# Patient Record
Sex: FEMALE | Race: WHITE | NOT HISPANIC OR LATINO | Employment: OTHER | URBAN - METROPOLITAN AREA
[De-identification: names, ages, dates, MRNs, and addresses within clinical notes are randomized per-mention and may not be internally consistent; named-entity substitution may affect disease eponyms.]

---

## 2017-02-21 ENCOUNTER — HISTORICAL (OUTPATIENT)
Dept: LAB | Facility: HOSPITAL | Age: 67
End: 2017-02-21

## 2017-04-12 ENCOUNTER — HISTORICAL (OUTPATIENT)
Dept: ADMINISTRATIVE | Facility: HOSPITAL | Age: 67
End: 2017-04-12

## 2017-06-19 ENCOUNTER — HISTORICAL (OUTPATIENT)
Dept: FAMILY MEDICINE | Facility: CLINIC | Age: 67
End: 2017-06-19

## 2017-06-21 ENCOUNTER — HISTORICAL (OUTPATIENT)
Dept: FAMILY MEDICINE | Facility: CLINIC | Age: 67
End: 2017-06-21

## 2017-07-05 ENCOUNTER — HISTORICAL (OUTPATIENT)
Dept: FAMILY MEDICINE | Facility: CLINIC | Age: 67
End: 2017-07-05

## 2018-04-09 ENCOUNTER — HISTORICAL (OUTPATIENT)
Dept: INTERNAL MEDICINE | Facility: CLINIC | Age: 68
End: 2018-04-09

## 2018-07-09 ENCOUNTER — HISTORICAL (OUTPATIENT)
Dept: FAMILY MEDICINE | Facility: CLINIC | Age: 68
End: 2018-07-09

## 2018-10-26 ENCOUNTER — HISTORICAL (OUTPATIENT)
Dept: FAMILY MEDICINE | Facility: CLINIC | Age: 68
End: 2018-10-26

## 2018-11-27 ENCOUNTER — HISTORICAL (OUTPATIENT)
Dept: ADMINISTRATIVE | Facility: HOSPITAL | Age: 68
End: 2018-11-27

## 2018-12-06 ENCOUNTER — HISTORICAL (OUTPATIENT)
Dept: FAMILY MEDICINE | Facility: CLINIC | Age: 68
End: 2018-12-06

## 2019-04-12 ENCOUNTER — HISTORICAL (OUTPATIENT)
Dept: RADIOLOGY | Facility: HOSPITAL | Age: 69
End: 2019-04-12

## 2019-04-30 ENCOUNTER — HISTORICAL (OUTPATIENT)
Dept: RADIOLOGY | Facility: HOSPITAL | Age: 69
End: 2019-04-30

## 2019-05-29 ENCOUNTER — HISTORICAL (OUTPATIENT)
Dept: LAB | Facility: HOSPITAL | Age: 69
End: 2019-05-29

## 2019-05-31 ENCOUNTER — HISTORICAL (OUTPATIENT)
Dept: RADIOLOGY | Facility: HOSPITAL | Age: 69
End: 2019-05-31

## 2019-12-20 ENCOUNTER — HISTORICAL (OUTPATIENT)
Dept: ADMINISTRATIVE | Facility: HOSPITAL | Age: 69
End: 2019-12-20

## 2019-12-20 LAB
CREAT UR-MCNC: 142 MG/DL
MICROALBUMIN UR-MCNC: 23.5 MG/L (ref 0–19)
MICROALBUMIN/CREAT RATIO PNL UR: 16.5 MCG/MG CR (ref 0–29)

## 2019-12-30 ENCOUNTER — HISTORICAL (OUTPATIENT)
Dept: FAMILY MEDICINE | Facility: CLINIC | Age: 69
End: 2019-12-30

## 2019-12-30 LAB
CHOLEST SERPL-MCNC: 251 MG/DL
CHOLEST/HDLC SERPL: 5.3 {RATIO} (ref 0–4.4)
HDLC SERPL-MCNC: 47 MG/DL (ref 40–59)
LDLC SERPL CALC-MCNC: 170 MG/DL
TRIGL SERPL-MCNC: 169 MG/DL
VLDLC SERPL CALC-MCNC: 34 MG/DL

## 2020-06-15 ENCOUNTER — HISTORICAL (OUTPATIENT)
Dept: FAMILY MEDICINE | Facility: CLINIC | Age: 70
End: 2020-06-15

## 2020-06-18 ENCOUNTER — HISTORICAL (OUTPATIENT)
Dept: INTERNAL MEDICINE | Facility: CLINIC | Age: 70
End: 2020-06-18

## 2020-06-25 ENCOUNTER — HISTORICAL (OUTPATIENT)
Dept: RADIOLOGY | Facility: HOSPITAL | Age: 70
End: 2020-06-25

## 2020-07-29 ENCOUNTER — HISTORICAL (OUTPATIENT)
Dept: LAB | Facility: HOSPITAL | Age: 70
End: 2020-07-29

## 2020-09-17 ENCOUNTER — HISTORICAL (OUTPATIENT)
Dept: LAB | Facility: HOSPITAL | Age: 70
End: 2020-09-17

## 2020-10-23 ENCOUNTER — HISTORICAL (OUTPATIENT)
Dept: LAB | Facility: HOSPITAL | Age: 70
End: 2020-10-23

## 2021-02-01 ENCOUNTER — HISTORICAL (OUTPATIENT)
Dept: FAMILY MEDICINE | Facility: CLINIC | Age: 71
End: 2021-02-01

## 2021-02-01 LAB
ALBUMIN SERPL-MCNC: 3.8 GM/DL (ref 3.4–4.8)
ALBUMIN/GLOB SERPL: 1.1 RATIO (ref 1.1–2)
ALP SERPL-CCNC: 136 UNIT/L (ref 40–150)
ALT SERPL-CCNC: 38 UNIT/L (ref 0–55)
AST SERPL-CCNC: 28 UNIT/L (ref 5–34)
BILIRUB SERPL-MCNC: 0.7 MG/DL
BILIRUBIN DIRECT+TOT PNL SERPL-MCNC: 0.3 MG/DL (ref 0–0.5)
BILIRUBIN DIRECT+TOT PNL SERPL-MCNC: 0.4 MG/DL (ref 0–0.8)
BUN SERPL-MCNC: 10 MG/DL (ref 9.8–20.1)
CALCIUM SERPL-MCNC: 9.2 MG/DL (ref 8.4–10.2)
CHLORIDE SERPL-SCNC: 106 MMOL/L (ref 98–107)
CO2 SERPL-SCNC: 27 MMOL/L (ref 23–31)
CREAT SERPL-MCNC: 0.81 MG/DL (ref 0.55–1.02)
DEPRECATED CALCIDIOL+CALCIFEROL SERPL-MC: 37.9 NG/ML (ref 30–80)
EST. AVERAGE GLUCOSE BLD GHB EST-MCNC: 188.6 MG/DL
GLOBULIN SER-MCNC: 3.5 GM/DL (ref 2.4–3.5)
GLUCOSE SERPL-MCNC: 198 MG/DL (ref 82–115)
HBA1C MFR BLD: 8.2 %
POTASSIUM SERPL-SCNC: 4.1 MMOL/L (ref 3.5–5.1)
PROT SERPL-MCNC: 7.3 GM/DL (ref 5.8–7.6)
SODIUM SERPL-SCNC: 143 MMOL/L (ref 136–145)
TSH SERPL-ACNC: 2.57 UIU/ML (ref 0.35–4.94)

## 2021-02-05 ENCOUNTER — HISTORICAL (OUTPATIENT)
Dept: FAMILY MEDICINE | Facility: CLINIC | Age: 71
End: 2021-02-05

## 2021-02-05 LAB
APPEARANCE, UA: ABNORMAL
BACTERIA #/AREA URNS AUTO: ABNORMAL /HPF
BILIRUB UR QL STRIP: NEGATIVE
COLOR UR: YELLOW
CREAT UR-MCNC: 91.3 MG/DL (ref 45–106)
CREAT UR-MCNC: 91.3 MG/DL (ref 45–106)
GLUCOSE (UA): >1000 MG/DL
HGB UR QL STRIP: NEGATIVE
HYALINE CASTS #/AREA URNS LPF: ABNORMAL /LPF
KETONES UR QL STRIP: NEGATIVE
LEUKOCYTE ESTERASE UR QL STRIP: NEGATIVE
MICROALBUMIN UR-MCNC: 10 UG/ML
MICROALBUMIN/CREAT RATIO PNL UR: 11 MG/GM CR (ref 0–30)
NITRITE UR QL STRIP: ABNORMAL
PH UR STRIP: 5.5 [PH] (ref 4.5–8)
PROT UR QL STRIP: NEGATIVE
PROT UR STRIP-MCNC: 10.3 MG/DL
PROT/CREAT UR-RTO: 112.8 MG/GM
RBC #/AREA URNS AUTO: ABNORMAL /HPF
SP GR UR STRIP: 1.02 (ref 1–1.03)
SQUAMOUS #/AREA URNS LPF: >100 /LPF
UROBILINOGEN UR STRIP-ACNC: NORMAL
WBC #/AREA URNS AUTO: ABNORMAL /HPF

## 2021-04-27 ENCOUNTER — HISTORICAL (OUTPATIENT)
Dept: ADMINISTRATIVE | Facility: HOSPITAL | Age: 71
End: 2021-04-27

## 2021-04-27 LAB
APPEARANCE, UA: CLEAR
BACTERIA #/AREA URNS AUTO: ABNORMAL /HPF
BILIRUB UR QL STRIP: NEGATIVE
COLOR UR: YELLOW
GLUCOSE (UA): NEGATIVE
HGB UR QL STRIP: NEGATIVE
HYALINE CASTS #/AREA URNS LPF: ABNORMAL /LPF
KETONES UR QL STRIP: NEGATIVE
LEUKOCYTE ESTERASE UR QL STRIP: 75 LEU/UL
NITRITE UR QL STRIP: ABNORMAL
PH UR STRIP: 5.5 [PH] (ref 4.5–8)
PROT UR QL STRIP: NEGATIVE
RBC #/AREA URNS AUTO: ABNORMAL /HPF
SP GR UR STRIP: 1.02 (ref 1–1.03)
SQUAMOUS #/AREA URNS LPF: ABNORMAL /LPF
UROBILINOGEN UR STRIP-ACNC: NORMAL
WBC #/AREA URNS AUTO: ABNORMAL /HPF

## 2021-06-10 ENCOUNTER — HISTORICAL (OUTPATIENT)
Dept: ADMINISTRATIVE | Facility: HOSPITAL | Age: 71
End: 2021-06-10

## 2021-06-23 ENCOUNTER — HISTORICAL (OUTPATIENT)
Dept: FAMILY MEDICINE | Facility: CLINIC | Age: 71
End: 2021-06-23

## 2021-06-23 LAB
CHOLEST SERPL-MCNC: 179 MG/DL
CHOLEST/HDLC SERPL: 5 {RATIO} (ref 0–5)
EST. AVERAGE GLUCOSE BLD GHB EST-MCNC: 154.2 MG/DL
HBA1C MFR BLD: 7 %
HDLC SERPL-MCNC: 38 MG/DL (ref 35–60)
LDLC SERPL CALC-MCNC: 111 MG/DL (ref 50–140)
TRIGL SERPL-MCNC: 149 MG/DL (ref 37–140)
VLDLC SERPL CALC-MCNC: 30 MG/DL

## 2021-06-27 LAB
LEFT EYE DM RETINOPATHY: NEGATIVE
RIGHT EYE DM RETINOPATHY: NEGATIVE

## 2021-07-12 ENCOUNTER — HISTORICAL (OUTPATIENT)
Dept: RADIOLOGY | Facility: HOSPITAL | Age: 71
End: 2021-07-12

## 2021-12-22 ENCOUNTER — HISTORICAL (OUTPATIENT)
Dept: ADMINISTRATIVE | Facility: HOSPITAL | Age: 71
End: 2021-12-22

## 2021-12-28 ENCOUNTER — HISTORICAL (OUTPATIENT)
Dept: FAMILY MEDICINE | Facility: CLINIC | Age: 71
End: 2021-12-28

## 2022-04-07 ENCOUNTER — HISTORICAL (OUTPATIENT)
Dept: ADMINISTRATIVE | Facility: HOSPITAL | Age: 72
End: 2022-04-07

## 2022-04-24 VITALS
OXYGEN SATURATION: 97 % | BODY MASS INDEX: 36.88 KG/M2 | WEIGHT: 208.13 LBS | HEIGHT: 63 IN | SYSTOLIC BLOOD PRESSURE: 136 MMHG | DIASTOLIC BLOOD PRESSURE: 74 MMHG

## 2022-04-28 NOTE — PROGRESS NOTES
Patient:   Chiquis Horner            MRN: [id number]            FIN: [id number]               Age:   66 years     Sex:  Female     :  1950   Associated Diagnoses:   History of cellulitis; Cough; Left lower lobe pneumonia   Author:   Damaris Carrasco MD      Visit Information   Visit type:  New symptom.    Accompanied by:  No one.    Source of history:  Self.    History limitation:  None.       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 Previously a patient of Dr. Shabazz but she would like to switch her PCP to the [facility] and states that she will schedule a wellness visit within the next month or so to discuss her chronic conditions.  Today, she is complaining of a cough, sore throat, headache, and nasal congestion for the past 7-10 days. She states that she spiked a fever 2 days ago but has otherwise been afebrile. She has been coughing up a green sputum but within the past few days, her sputum has become jeramie-colored brown. She has been taking OTC Mucinex and cough syrups, which have not been helping. She has also tried allergy medications, which haven't helped either. Her father recently passed away and so she has been traveling a lot back and forth between [place] and [place], which has put a lot of stress on her and made this illness worse. She also feels tired but denies severe fatigue and has still been working all week.    She has recently recovered from RLE cellulitis, which finally resolved after a course of PO Clindamycin. She states that her right leg is healing well and is no longer swollen.      Review of Systems   Constitutional:  Chills, Weakness, No fever, No fatigue.    Eye:  No recent visual problem.    Ear/Nose/Mouth/Throat:  Nasal congestion, Sore throat, No ear pain.    Respiratory:   Shortness of breath (with exertion), Cough, Sputum production, Wheezing.    Cardiovascular:  No chest pain, No palpitations, No peripheral edema.    Gastrointestinal:  Nausea, Vomiting (once, a few days ago after coughing), No diarrhea, No constipation.    Genitourinary:  No dysuria.    Musculoskeletal:  No joint pain.    Integumentary:  No rash.    Neurologic:  Headache.    Psychiatric:  No anxiety.    All other systems are negative      Physical Examination   Vital Signs   4/12/2017 8:37 CDT       Temperature Oral          36.9 DegC                             Peripheral Pulse Rate     86 bpm                             Respiratory Rate          22 br/min                             SpO2                      96 %                             Systolic Blood Pressure   139 mmHg                             Diastolic Blood Pressure  72 mmHg     General:  Alert and oriented, No acute distress.    Eye:  Pupils are equal, round and reactive to light, Extraocular movements are intact, Normal conjunctiva.    HENT:  Normocephalic.    Neck:  Supple, Non-tender.    Respiratory:  Respirations are non-labored, Breath sounds are equal, Symmetrical chest wall expansion, No chest wall tenderness, Coughs with deep respirations, coarse breath sounds in bilateral bases with some crackles appreciated in the left lower lobe.    Cardiovascular:  Normal rate, Regular rhythm, No murmur, No gallop, Good pulses equal in all extremities.    Gastrointestinal:  Soft, Non-tender, Non-distended, Normal bowel sounds.    Genitourinary:  No costovertebral angle tenderness.    Integumentary:  Warm, Dry, RLE swelling resolved and area of weeping and infection healing well and resolving.    Neurologic:  Alert, Oriented.    Psychiatric:  Cooperative.       Review / Management   Influenza nasal swab negative today    CXR (done today):  Reason For Exam  Cough  Radiology Report  CLINICAL:  Cough.  COMPARISON: None available.     FINDINGS:   Cardiopericardial silhouette is within normal limits.  Patchy infiltrates involve the left lower lung lobe. There is no  pulmonary edema, pleural effusion or pneumothorax.      IMPRESSION:  Left lower lung lobe infiltrates.    Signature Line  Electronically Signed By: Jose Mcneil MD  Date/Time Signed: 04/12/2017 10:36      Impression and Plan   Diagnosis     History of cellulitis (LWV79-ZM Z87.2).     Cough (DYB53-OT R05).     Left lower lobe pneumonia (PLP01-GP J18.9).     Plan:  Influenza swab negative today  CXR with LLL infiltrate. CURB 65 score of 1 (low risk at 2.7%), outpatient treatment recommended  Rx sent to pt's pharmacy for Levaquin 750mg PO daily x 10 days  Work excuse provided for the remainder of the week  Rx also provided for Cheratussin AC 10mL qhs PRN, as well as tessalon perles TID PRN  Refill provided for ProAir inhaler at pt's request  ED precautions discussed in detail, especially for worsening SOB, fever, or hemoptysis    RTC in 2-3 weeks for close follow up

## 2022-04-28 NOTE — PROGRESS NOTES
Patient:   Chiquis Horner            MRN: [id number]            FIN: [id number]               Age:   69 years     Sex:  Female     :  1950   Associated Diagnoses:   Depression; Elevated glucose level; Hyperlipidemia; Immobility; Mixed incontinence   Author:   Britney Alfaro MD      Visit Information   Visit type:  Scheduled follow-up.    Accompanied by:  No one.    Source of history:  Self.    Referral source:  Self.    History limitation:  None.       Chief Complaint   2019 10:27 CST     requesting Cymbalta, med refills, 90 day supply of medication, handicap sticker, pain still has not subsided, requesting Ibuprofen 800mg Rx      History of Present Illness     This is a 69-year-old female who presents to clinic for follow-up:    Requesting all medications to be changed to 90 day supply    Has been doing physical therapy 2x/week and really benefitting from therapy. Still takes ibuprofen OTC (3 tabs a day)- reports no side effects.   Does not require a walker every day- only on bad days  Requesting a handicap tag    Still experiences a lot of stress associated with her job; Cymbalta really helping with depressed mood   Takes Cymbalta not as prescribed- 60 mg in the morning and 20 mg at night    Reports BP is taken at work often and is always low 140s or lower (SBP) and <90 (DBP)    Has been off of cholesterol medication ever since her pain started (for concern that muscle pain related to cholesterol medication)    Urinary incontinence- mixed; unable to get into urogynecology appointment but reports Dr Graham is willing to do a bladder sling surgery for her. Continues to take oxybutynin 10 mg BID. denies falls, sedation, confusion associated with medication.      Reports takes hyoscyamine as needed for when she gets stomach cramps associated with eating certain foods. Takes sparingly.       Review of Systems     Constitutional: no fever, chills, or sweats.  Musculoskeletal: no joint pain, +  muscle cramping (buttock), no back pain.  Integumentary: no rash or pruritus.  Neurologic: no weakness, no decreased sensation, no tingling.      Physical Examination   Vital Signs   12/20/2019 10:27 CST     Temperature Oral          37.2 DegC                             Temperature Oral (calculated)             98.96 DegF                             Peripheral Pulse Rate     77 bpm  (Modified)                            Respiratory Rate          20 br/min                             Oxygen Therapy            Room air                             Systolic Blood Pressure   154 mmHg  HI                             Diastolic Blood Pressure  77 mmHg                             Blood Pressure Location   Right arm                             Manual Cuff BP            No                             O2 SAT at rest            97 %  (Modified)      General: pleasant, in no apparent distress.  Respiratory: unlabored breathing, symmetric chest rise.  Cardiovascular: regular rate.  Integumentary: warm, dry.  Neurologic: alert and oriented, no focal neurologic defects.       Impression and Plan   Diagnosis     Depression (RFZ35-KE F32.9).     Elevated glucose level (SOQ89-QN R73.09).     Hyperlipidemia (BUL09-YL E78.5).     Immobility (HTM00-EB Z74.09).     Mixed incontinence (HTT00-PH N39.46).       Cymbalta dosing changed to 60 mg qAM   Continue with oxybutynin- precautions given  Interaction with hyoscyamine discussed with patient- recommended not to take unless absolutely necessary   Will call patient to  handicap form once ready  POC A1c ordered  urine micro/alb ordered  Medications refilled for 90 days  Lipid panel ordered; will review and restart on cholesterol medication  Elevated BP noted; per patient BP is consistently well-controlled, will continue to monitor     RTC in 3 months

## 2022-05-01 NOTE — HISTORICAL [facility]LG CERNER
This is a historical note converted from Sabi. Formatting and pictures may have been removed.  Please reference Sabi for original formatting and attached multimedia. Procedure Name  ?  Date/Time:?12/22/2021 13:22:43  Procedure:??Right? ?Trigger Point (Medial Border of Scapula)??(one time injection)  Indications:??Therapeutic?Indication - decrease pain, increase range of motion and improve quality of life  RISKS: Possible complications with injection include?bleeding, infection (0.01%), tendon rupture, steroid flare, fat pad or soft tissue atrophy, skin depigmentation, allergic reaction to medications, and vasovagal response. ?(Steroid flare treatment is rest, ice, NSAIDs and resolves in 24-36 hours.)  Consent:???Procedure, risks, benefits, & alternatives explained to patient, who voiced understanding and agreed to proceed with procedure. ?Consent signed and?scanned into the medical record. No absolute contraindications (cellulitis overlying joint, infection, lack of informed consent, allergy to injection mediation, marie protein or egg allergy for sodium hyaluronate, or history of steroid flare) or relative contraindications (brittle or out of control DM HgA1c > 10, coagulopathy INR > 3.5, previous joint replacement, or history of AVN).  Staff:?MD Magalie  Description:?Time out performed.?The patient was prepped using Chlorhexidine scrub after the appropriate identification of anatomic landmarks.? Sterile needle used (size # 21 gauge, length 1.5 inch)?Topical anesthetic of ethyl chloride was used.? ?5 mL of 1% lidocaine plain injected.  Complications:?None?  EBL:??None  Post Procedure:?Patient reports improvement in pain and ROM.?Pain level 0/10 post procedure. Patient alert, moving all extremities. ?Good peripheral pulses, no signs of vascular compromise, range of motion intact. ?Patient tolerated procedure well. ?Aftercare instructions were given to patient at time of discharge.??Relative rest for 3 days -  avoiding excessive activity. ?Place ice on area for 15 minutes every 4 to 6 hours. ?Tylenol 1000mg twice a day or ibuprofen 600 mg three times per day for next 3-4 days if not on medication already. ?Protect the area for the next 1-8 hours if anesthetic was used. ?Avoid excessive activity for next 3 to 4 weeks.?ER precautions for fever, severe joint pain, or allergic reaction or other new symptoms related to joint injection.  ?  Faculty Attestation:?Procedure note reviewed.?Patient tolerated the procedure well. - Alexandr Mejias MD?

## 2022-05-01 NOTE — HISTORICAL [facility] CERNER
This is a historical note converted from [facility]. Formatting and pictures may have been removed.  Please reference Sabi for original formatting and attached multimedia. Chief Complaint  F/U visit. Urge incontinence. Med refill need. 2nd Shingrix vaccine need  History of Present Illness  ?  69 yo female here for FU DM HTN depression  ?   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   new c/o - fine? tremor right hand on intention, not interfering with ADLs or work,?>2 fly members with same  ?  ?  Review of Systems  Constitutional:?no fever, weakness  ENMT:?no sore throat, ear pain, sinus pain/congestion, nasal congestion/drainage  Respiratory:?no cough, wheezing, shortness of breath  Cardiovascular:?no chest pain, palpitations  Gastrointestinal:?no nausea, vomiting, or diarrhea, no abdominal pain  Genitourinary:?no dysuria, min urinary frequency, urgency,no hematuria  Physical Exam  Vitals & Measurements  T:?36.9? ?C (Oral)? HR:?75(Peripheral)? RR:?18? BP:?136/75? SpO2:?95%?  HT:?160.00?cm? WT:?108.300?kg? BMI:?42.3?  General - NAD, comfortable  HENT_ nl TM,EAC, josiah w/o redness  Neck - no node, mass ,thyromegaly  Respiratory - CTA BL, no distress  Cardiovascular - RRR, no murmur  ?  ?  Assessment/Plan  DM  HTN - no meds  HLD  Depression  Urge Incontinence  Familial tremor  ?  ?  PLAN  cont all meds  new Glucometer ordered  Mammo  consider Inderal for tremor  Start Prozac 10mg  consider ACEI at follow up  Shingrix #2  foot exam done  RTC 3 mos - lab  Referrals  [facility] Referral to [facility], Specialty: Ophthalmology, Reason: fundus photo, Refer To: Jose Luis CLARK, [place] ELIZALDE, [facility], [address] [place] [address]., Start: 04/27/21 8:45:00 CDT  Clinic Follow up, *Est.  07/27/21 3:00:00 CDT, Order for future visit, Arthritis of carpometacarpal (CMC) joint of right thumb, [facility]  Clinic Follow up, *Est. 07/27/21 7:40:00 CDT, me only - after 8/1/2021, Tuesday am clinic, Order for future visit, HTN (hypertension)(  Confirmed  ), [facility]   Problem List/Past Medical History  Ongoing  Chronic GERD  Depression  Diabetes mellitus  HTN (hypertension)(  Confirmed  )  Hyperlipidemia(  Confirmed  )  Osteoarthritis of right thumb  Urge incontinence  Historical  HTN (hypertension)  Left hip pain  Pregnant  Procedure/Surgical History  Colonoscopy (02/27/2014)  ISAIAH/BSO (1987)  Cervical Laminectomy (1977)   Medications  Crestor 5 mg oral tablet, 5 mg= 1 tab(s), Oral, Daily, 1 refills  Glucophage  mg oral tablet, extended release, 500 mg= 1 tab(s), Oral, BID, 1 refills  naproxen 500 mg oral tablet, 500 mg= 1 tab(s), Oral, BID, PRN, 2 refills  VESIcare 10 mg oral tablet, 10 mg= 1 tab(s), Oral, Daily, 1 refills  Vitamin D 1000 units tablets, 1 tab, Oral, Daily, 3 refills  Voltaren 1% topical gel, 2 gm, TOP, QID, 2 refills  Wellbutrin  mg/24 hours oral tablet, extended release, 300 mg= 1 tab(s), Oral, Daily, 1 refills  Allergies  No Known Medication Allergies      UC E coli resistant to quinolones, 2/2021 E coli - Macrobid x 5 days, = GI upset  Omnicef x 7 days sent  pt informed

## 2022-05-01 NOTE — HISTORICAL [facility] CERNER
This is a historical note converted from Sabi. Formatting and pictures may have been removed.  Please reference Sabi for original formatting and attached multimedia. Chief Complaint  right hand ?pain  History of Present Illness  70?yo RHD W female non-smoker hx of?DM II, GERD,?Depression?presents to [facility] for initial evaluation of right thumb pain  ?   Right thumb pain - onset,?3 months ago, gradual, thumb painful to extend. Frequent Sourav hobby, makes it worse. Better with rest and Voltaren gel, Aspercreme with Lidocaine, taking Ibuprofen 600 mg 4x daily with relief, no relief with Mobic.  ?   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 Collins  Previous treatment: _ Voltaren gel, Aspercreme with Lidocaine.  Previous injuries: _ none  Fam Hx of Arthritis: _ none  Review of Systems  10?point ROS negative other than HPI  Physical Exam  Vitals & Measurements  HR:?87(Peripheral)? BP:?138/77?  HT:?160.00?cm? WT:?107.500?kg? BMI:?41.99?  R Hand:  Inspection: no swelling, no erythema, no bruising, no thenar atrophy  Palpation: no TTP  ROM: Full ROM in all planes: flexion, extension, abduction, adduction  Strength: Flexion 5/5, Extension 5/5, Abduction 5/5, Adduction 5/5, fair  Strength  Neurovascular: 2+ radial pulse, sensation intact throughout  ?  Special Tests:  Tinels Test: negative  Finkelsteins Test: positive  CMC Grind Test: positive  Allens Test: patent  UCL laxity: negative  Table Top test: negative  ?  L Hand:  Inspection: no swelling, no erythema, no bruising, no thenar atrophy  Palpation: no TTP  ROM: Full ROM in all planes: flexion, extension, abduction, adduction  Strength: Flexion 5/5, Extension 5/5, Abduction 5/5, Adduction 5/5, fair  Strength  Neurovascular: 2+ radial pulse, sensation intact throughout  ?  Special Tests:  Tinels Test: negative  Finkelsteins Test: negative  CMC Grind Test: negative  Allens Test: patent  UCL laxity:  negative  Table Top test: negative  ?  General: well developed; well nourished; cooperative  PSYCH: alert and oriented x 3 with appropriate mood and affect  SKIN: inspection and palpation of skin and soft tissue normal; no scars noted on upper/lower extremities  CV: vascular integrity noted; +2 symmetrical pulses, no edema  NEURO: sensation intact by light touch; DTRs +2 bilateral and symmetrical  LYMPH: no LAD noted  Assessment/Plan  1.?Arthritis of carpometacarpal (CMC) joint of right thumb?M18.11  Plan:?Patient with initial conservative management at this time.discussed treatment options with patient.  Rads:?imaging ordered and independently reviewed by me, discussed with patient, radiologist read pending  Immobilization:?thumb spica splint given, advised to use with activities that flare symptoms  Therapy:?HEP  Rx:?Script given for Naproxen?500 mg BID. Side effects discussed. stop Ibuprofen  Work-up:?no additional imaging needed at this time  Activity:?as tolerated  RTC:?3 months?to consider CSI in CMC if conservative measures inadequate  Discussed with the resident at time of visit? Patient chart reviewed. Patient seen and evaluated at time of visit.?HPI, PE, and Assessment and Plan reviewed. Treatment plan is reasonable and appropriate.? All questions were answered.??Compliance with treatment plan is appropriate.  ?Radiology images independently reviewed and agree with radiologist. ?Radiology images independently reviewed and agree with resident.?-Wyatt Amador, DO CAQSM   Medications  Crestor 5 mg oral tablet, 5 mg= 1 tab(s), Oral, Daily, 1 refills  esomeprazole 20 mg oral delayed release capsule, 20 mg= 1 cap(s), Oral, Daily, 1 refills  Glucophage  mg oral tablet, extended release, 500 mg= 1 tab(s), Oral, BID, 1 refills  naproxen 500 mg oral tablet, 500 mg= 1 tab(s), Oral, BID, PRN, 2 refills  Prozac 10 mg oral capsule, 10 mg= 1 cap(s), Oral, Daily, 2 refills  supply, See Instructions  VESIcare 10 mg oral  tablet, 10 mg= 1 tab(s), Oral, Daily, 1 refills  Vitamin D 1000 units tablets, 1 tab, Oral, Daily, 3 refills  Voltaren 1% topical gel, 2 gm, TOP, QID, 2 refills  Wellbutrin  mg/24 hours oral tablet, extended release, 300 mg= 1 tab(s), Oral, Daily, 1 refills  Problems/Past Medical History  DM II, GERD, Depression  Procedures/Surgical Procedures  Colonoscopy (02/27/2014)  ISAIAH/BSPEMA (1987)  Cervical Laminectomy   Diagnostic Results  XR Right hand 4/27/2021, My interpretation:  No fractures or dislocations. Right 1st digit CMC joint with degeneration and subchondral thickening

## 2022-05-01 NOTE — HISTORICAL [facility] CERNER
This is a historical note converted from Sabi. Formatting and pictures may have been removed.  Please reference Sabi for original formatting and attached multimedia. Chief Complaint  Left hip  History of Present Illness  70 Years?old?RHD?Female?non-smoker?presents to ?[facility]?for?follow up?visit?for?left hip pain 2/2 gluteus minimus tear.?  ?   Interval h/o: 6 week increasing pain left, no reinjury.  ?   DOI/JUAN: 2 years ago, no acute injury at that time. MRI with Gluteus minimus and medius tear. Improved with PT and dry needling. 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 Ibuprofen scheduled.?Voltaren cream.  Worse with walking, especially on incline. Improved with rest  Previously seen by:?PCP, [facility]  Current pain level: ?0-10/10, ?throbbing, Shooting pain wrapping around leg to thigh  Review of Systems  Constitutional: no fever, no chills, no weight loss  CV: no swelling, no edema  Resp: no SOB, wheezing  GI: no fecal incontinence  : no urinary retention, no urinary incontinence  Skin: no rash, no wound  Neuro: no numbness/tingling, no weakness, no saddle anesthesia  MSK: as above  Psych: no depression, no anxiety  Heme/Lymph: no easy bruising, no easy bleeding, no lymphadenopathy  Immuno: no MRSA history  Physical Exam  Vitals & Measurements  HR:?83(Peripheral)? BP:?162/84?  ?  R Hip:  Inspection:??Normal?gait, ?full?weightbearing, ?normal?alignment, ?no?swelling, ?no?erythema,??no?bruising, ?no?atrophy  Palpation:??No?tenderness to palpation  ROM:?  Flexion (0-110/130):?130? degrees  Extension (0-30):?30?degrees  Crepitus:? Negative  Strength:? Flexion ?5/5, Extension ?5/5  Neurovascular:? 2+?distal pulse bilaterally, sensation intact  ?   Special Tests:  VANDANA: ?negative  FADIR:?negative  JANET:?negative  Log Roll: ?negative  [place]:?negative  ?  L Hip:  Inspection:??Normal?gait, ?full?weightbearing,  ?normal?alignment, ?no?swelling, ?no?erythema,??no?bruising, ?no?atrophy  Palpation:??No?tenderness to palpation  ROM:?  Flexion (0-110/130):??130 degrees  Extension (0-30):?30?degrees  Crepitus:? Negative  Strength:? Flexion ?4/5, Extension ?4/5  Neurovascular:? 2+?distal pulse bilaterally, sensation intact  ?   Special Tests:  VANDANA: ?positive  FADIR:?positive  JANET:?negative  Log Roll: ?negative  [place]:?negative  ?  General: well developed;?well nourished; cooperative  PSYCH: alert and oriented x 3?with?appropriate mood and affect  SKIN: inspection and palpation of skin and soft tissue normal; no scars noted on upper/lower extremities  CV: vascular integrity noted; +2 symmetrical pulses, no edema  NEURO: sensation intact by light touch; DTRs +2 bilateral and symmetrical  LYMPH: no LAD noted  Assessment/Plan  1.?Tear of left gluteus minimus tendon?S76.012A  ?-?Moderate?acute?exacerbation  - Radiological studies ordered and?interpreted by me, my independent interpretation attached, reviewed my findings with patient and showed them their images  -?CSI (corticosteroid injection)?performed today to?Greater Trochanter, consented, tolerated well, NVI (neurovascularly in tact)?following injection and improvement in symptoms; pain?after injection was improved  -?Activity as tolerated  -?HEP (Home Exercise Program), Tyl prn, Ice/Heat prn, prescription NSAID  - Follow up?4wks, repeat assessment, consider intra-articular injection of L hip if pain no improved with Greater trochanter injection  ?   Discussed with the resident at time of visit? Patient chart reviewed. Patient seen and evaluated at time of visit.?HPI, PE, and Assessment and Plan reviewed. Treatment plan is reasonable and appropriate.? All questions were answered.??Compliance with treatment plan is appropriate.  ?Radiology images independently reviewed and agree with radiologist. ?Radiology images independently reviewed and agree with resident.?-Wyatt Amador, DO  CAQSM  Ordered:  Lidocaine inj., 1 mL, form: Injection, Intra-Articular, Once, first dose 06/10/21 9:13:00 CDT, stop date 06/10/21 9:13:00 CDT  triamcinolone, 40 mg, form: Injection, Intra-Articular, Once, first dose 06/10/21 9:13:00 CDT, stop date 06/10/21 9:13:00 CDT  ?  Orders:  XR Hip Left 2 Views w/AP Pelvis, Routine, 06/10/21 8:16:00 CDT, None, Ambulatory, Rad Type, Left hip pain, Not Scheduled, 06/10/21 8:16:00 CDT   Medications  Crestor 5 mg oral tablet, 5 mg= 1 tab(s), Oral, Daily, 1 refills  esomeprazole 20 mg oral delayed release capsule, 20 mg= 1 cap(s), Oral, Daily, 1 refills  Glucophage  mg oral tablet, extended release, 500 mg= 1 tab(s), Oral, BID, 1 refills  naproxen 500 mg oral tablet, 500 mg= 1 tab(s), Oral, BID, PRN, 2 refills  Omnicef 300 mg oral capsule, 300 mg= 1 cap(s), Oral, q12hr  Prozac 10 mg oral capsule, 10 mg= 1 cap(s), Oral, Daily, 2 refills  supply, See Instructions  VESIcare 10 mg oral tablet, 10 mg= 1 tab(s), Oral, Daily, 1 refills  Vitamin D 1000 units tablets, 1 tab, Oral, Daily, 3 refills  Voltaren 1% topical gel, 2 gm, TOP, QID, 2 refills  Wellbutrin  mg/24 hours oral tablet, extended release, 300 mg= 1 tab(s), Oral, Daily, 1 refills  Diagnostic Results  XR L hip and pelvis 6/10/2021: No acute fracture or dislocation. Moderate L hip OA, Mild R hip OA

## 2022-05-01 NOTE — HISTORICAL [facility] CERNER
This is a historical note converted from Sabi. Formatting and pictures may have been removed.  Please reference Sabi for original formatting and attached multimedia. ?  Date/Time:?6/10/2021 09:38:51  Procedure:??Left? ?Greater Trochanteric Bursa Injection??(one time injection)  Indications:??Therapeutic?Indication - decrease pain, increase range of motion and improve quality of life  RISKS: Possible complications with injection include?bleeding, infection (0.01%), tendon rupture, steroid flare, fat pad or soft tissue atrophy, skin depigmentation, allergic reaction to medications, and vasovagal response. ?(Steroid flare treatment is rest, ice, NSAIDs and resolves in 24-36 hours.)  Consent:???Procedure, risks, benefits, & alternatives explained to patient, who voiced understanding and agreed to proceed with procedure. ?Consent signed and?scanned into the medical record. No absolute contraindications (cellulitis overlying joint, infection, lack of informed consent, allergy to injection mediation, marie protein or egg allergy for sodium hyaluronate, or history of steroid flare) or relative contraindications (brittle or out of control DM HgA1c > 10, coagulopathy INR > 3.5, previous joint replacement, or history of AVN).  Staff:?Wyatt Amador,   Description:?Time out performed.?The patient was prepped using Chlorhexidine scrub after the appropriate identification of anatomic landmarks.? Sterile needle used (25g 1.5inch)?Topical anesthetic of ethyl chloride was used.? ?1?mL of 1% lidocaine plain with 40 mg of Kenalog injected  Complications:?None?  EBL:??None  Post Procedure:?Patient reports improvement in pain and ROM.?Pain improved immediately post procedure. Patient alert, moving all extremities. ?Good peripheral pulses, no signs of vascular compromise, range of motion intact. ?Patient tolerated procedure well. ?Aftercare instructions were given to patient at time of discharge.??Relative rest for 3 days - avoiding  excessive activity. ?Place ice on area for 15 minutes every 4 to 6 hours. ?Tylenol 1000mg twice a day or ibuprofen 600 mg three times per day for next 3-4 days if not on medication already. ?Protect the area for the next 1-8 hours if anesthetic was used. ?Avoid excessive activity for next 3 to 4 weeks.?ER precautions for fever, severe joint pain, or allergic reaction or other new symptoms related to joint injection.  ?   Fatoumata Quevedo M.D.  [facility], [facility]  ?   Procedure note reviewed. ?Immediately available in the clinic. ?Patient tolerated the procedure well and I was present in the room for the injection at bedside..? -Wyatt Amador, DO CAQSM

## 2022-05-01 NOTE — HISTORICAL [facility] CERNER
This is a historical note converted from Sabi. Formatting and pictures may have been removed.  Please reference Sabi for original formatting and attached multimedia. Chief Complaint  Right shoulder  History of Present Illness  72 y/o female presents with right shoulder pain x 5 years.  Patient reports any specific activity that elicited her shoulder pain. She reports that pain is on posterior shoulder along scapula with pin point tenderness. She reports difficulty sleeping at night due to pain. Has tried conservative measures including NSAIDS and Voltaren gel without relief. Has not done PT for her shoulder. Denies any new trauma or falls.  Onset: ?Insidious over years??progressively worsening  Current pain level: 8/10??R? without pain medication.  Medications r/t complaint: Patient reports using?OTC?medications in past including:?OTC pain relievers.  Modifying Factors: ?Worse with/after activity;?Improved with rest  Injury:?Denies  Previous treatment: NSAIDs and Voltaren Gel.  Associated Symptoms:?Crepitus/Grinding; ?No numbness or tingling;??No swelling;?No skin changes;?No weakness;?Mild decrease in ROM;??difficulty sleeping at night s/t pain;?  Activity:?Sedentary, full ADLs;?Pain occasionally interferes with ADLs (moderate)  PMH:? DM, obesity and OA.  Family History:?Family history of arthritis  PCP:?MD Karina  Employment:? Retired  Review of Systems  10 review of systems negative except as stated in HPI  Physical Exam  Vitals & Measurements  HR:?71(Peripheral)? BP:?154/79?  HT:?160.00?cm? WT:?95.300?kg? BMI:?37.23?  MSK:??Left Shoulder  Inspection:??no swelling, ?no erythema,??no bruising, ?no atrophy  Palpation: ?no tenderness  ROM:  ?? Active Passive   Forward Flexion (0-180) 160 180   Extension (0-60) 50 60   Abduction (0-180) 140 180   Adduction (0-140) 120 140   Internal Rotation?(0-90) 70 80   External Rotation (0-60) 40 60   no winging scapula; ?no scapular dyskinesis  STG:?4/5; Empty can  -?negative; Lift-off -?negative; Drop Arm -?negative  Special Testing:  Neers - ?negative; Rojas - ?negative?Liz - ?negative  Crossbody Abduction - ?negative  Speeds - ?negative; Yergason - ?negative  OBriens - ?negative; Crank - ?negative  Sulcus - ?negative; Apprehension - ?negative; Relocation - ?negative;?  ?  MSK:??Right Shoulder  Inspection:??no swelling, ?no erythema,??no bruising, ?no atrophy  Palpation: ?tender at medial border of scapula with palpable spasm  ROM:  ?? Active Passive   Forward Flexion (0-180) 160 180   Extension (0-60) 50 60   Abduction (0-180) 130 180   Adduction (0-140) 120 140   Internal Rotation?(0-90) 60 80   External Rotation (0-60) 50 60   no winging scapula;?? Slight Scapular dyskinesis noted in comparison to left  STG:?4/5; Empty can -?negative; Lift-off -?negative; Drop Arm -?negative  Special Testing:  Neers - ?negative; Hawkings - ?negative?Liz - ?negative  Crossbody Abduction - ?negative  Speeds - ?negative; Yergason - ?negative  OBriens - ?negative; Crank - ?negative  Sulcus - ?negative; Apprehension - ?negative; Relocation - ?negative;?  Assessment/Plan  1.?Trigger point of shoulder region?M25.519  DX: Trigger point? along the medial border of the scapula. No significant weakness in comparing shoulder from right to left. Patient has had chronic pain for years with some relief with anti-inflammatory medications.Discussed with patient diagnosis and treatment recommendations.? Handout given.  Imaging:?radiological studies ordered and independently reviewed; discussed with patient; pending radiologist interpretation  Treatment plan:?NDSAIDs/Voltaren gel PRN.? PT x 12 weeks. Would benefit from STIM. elected for trigger point injection.  Weight Management is paramount:?recommend at least 10 pounds weight loss  Procedure:?Discussed Lidocaine injection vs conservative measures and patient elected to have Injection today.  Activity:?Activity as tolerated  Therapy:?formal PT/OT  ordered  Medication:?NSAIDS PRN  RTC:?PRN  Additional work-up:?none  2.?DM2 (diabetes mellitus, type 2)?E11.9  Dietary and lifestyle changes  Management as per PCP  3.?HTN (hypertension)?I10  ?Nonsteroidal anti-inflammatory drugs (NSAIDs) may disrupt control of blood pressure in hypertensive patients and increase their risk of morbidity, mortality, and the costs of care. In general, people with high blood pressure should use acetaminophen or possibly aspirin for over-the-counter pain relief. Discuss with your primary health care provider if the use of any NSIADs (such as ibuprofen, ketoprofen, naproxen sodium, or ?meloxicam) is appropriate for you.  ?  Faculty Attestation:?Chiquis Siri?was seen in?[facility].??Discussed with ?[place] at the time of the visit.?History of Present Illness, Physical Exam, and Assessment and Plan reviewed. Treatment plan is reasonable and appropriate. Compliance with treatment recommendations is important.??Radiology images independently reviewed and agree with fellow interpretation.?- Alexandr Mejias MD   Medications  Crestor 10 mg oral tablet, 10 mg= 1 tab(s), Oral, Daily, 1 refills  esomeprazole 20 mg oral delayed release capsule, See Instructions, 1 refills  Farxiga 5 mg oral tablet, 5 mg= 1 tab(s), Oral, Daily, 1 refills  Glucophage  mg oral tablet, extended release, 500 mg= 1 tab(s), Oral, BID, 1 refills  Prozac 10 mg oral capsule, 10 mg= 1 cap(s), Oral, Daily, 1 refills  supply, See Instructions  Topamax 50 mg oral tablet, 50 mg= 1 tab(s), Oral, At Bedtime, 1 refills  VESIcare 10 mg oral tablet, 10 mg= 1 tab(s), Oral, Daily, 1 refills  Vitamin D 1000 units tablets, 1 tab, Oral, Daily, 3 refills  Voltaren 1% topical gel, 2 gm, TOP, QID, 2 refills  Wellbutrin  mg/24 hours oral tablet, extended release, 300 mg= 1 tab(s), Oral, Daily, 1 refills

## 2022-10-07 ENCOUNTER — DOCUMENTATION ONLY (OUTPATIENT)
Dept: FAMILY MEDICINE | Facility: CLINIC | Age: 72
End: 2022-10-07
Payer: MEDICARE

## 2022-10-17 ENCOUNTER — OFFICE VISIT (OUTPATIENT)
Dept: FAMILY MEDICINE | Facility: CLINIC | Age: 72
End: 2022-10-17
Payer: MEDICARE

## 2022-10-17 VITALS
DIASTOLIC BLOOD PRESSURE: 78 MMHG | BODY MASS INDEX: 36.52 KG/M2 | RESPIRATION RATE: 18 BRPM | WEIGHT: 206.13 LBS | SYSTOLIC BLOOD PRESSURE: 138 MMHG | TEMPERATURE: 98 F | HEART RATE: 82 BPM | OXYGEN SATURATION: 100 % | HEIGHT: 63 IN

## 2022-10-17 DIAGNOSIS — E78.5 HYPERLIPIDEMIA, UNSPECIFIED HYPERLIPIDEMIA TYPE: ICD-10-CM

## 2022-10-17 DIAGNOSIS — F32.A DEPRESSIVE DISORDER: ICD-10-CM

## 2022-10-17 DIAGNOSIS — Z12.31 BREAST CANCER SCREENING BY MAMMOGRAM: ICD-10-CM

## 2022-10-17 DIAGNOSIS — E11.9 TYPE 2 DIABETES MELLITUS WITHOUT COMPLICATION, WITHOUT LONG-TERM CURRENT USE OF INSULIN: ICD-10-CM

## 2022-10-17 DIAGNOSIS — I10 HYPERTENSION, UNSPECIFIED TYPE: Primary | ICD-10-CM

## 2022-10-17 DIAGNOSIS — Z23 IMMUNIZATION DUE: ICD-10-CM

## 2022-10-17 PROBLEM — M19.049 OSTEOARTHRITIS OF HAND: Status: ACTIVE | Noted: 2022-10-17

## 2022-10-17 PROBLEM — K21.9 GASTROESOPHAGEAL REFLUX DISEASE: Status: ACTIVE | Noted: 2022-10-17

## 2022-10-17 PROCEDURE — G0008 ADMIN INFLUENZA VIRUS VAC: HCPCS | Mod: PBBFAC

## 2022-10-17 PROCEDURE — 99214 OFFICE O/P EST MOD 30 MIN: CPT | Mod: PBBFAC,25 | Performed by: FAMILY MEDICINE

## 2022-10-17 RX ORDER — TOPIRAMATE 50 MG/1
50 TABLET, FILM COATED ORAL NIGHTLY
COMMUNITY
Start: 2022-07-18 | End: 2022-10-17

## 2022-10-17 RX ORDER — BUPROPION HYDROCHLORIDE 300 MG/1
300 TABLET ORAL DAILY
Qty: 90 TABLET | Refills: 1 | Status: SHIPPED | OUTPATIENT
Start: 2022-10-17 | End: 2023-01-17 | Stop reason: SDUPTHER

## 2022-10-17 RX ORDER — METFORMIN HYDROCHLORIDE 500 MG/1
500 TABLET, EXTENDED RELEASE ORAL DAILY
Qty: 180 TABLET | Refills: 1 | Status: SHIPPED | OUTPATIENT
Start: 2022-10-17 | End: 2023-01-17 | Stop reason: SDUPTHER

## 2022-10-17 RX ORDER — HYOSCYAMINE SULFATE 0.12 MG/1
0.12 TABLET SUBLINGUAL EVERY 4 HOURS PRN
COMMUNITY
Start: 2022-07-18 | End: 2022-10-17 | Stop reason: SDUPTHER

## 2022-10-17 RX ORDER — DAPAGLIFLOZIN 5 MG/1
5 TABLET, FILM COATED ORAL DAILY
Qty: 90 TABLET | Refills: 1 | Status: SHIPPED | OUTPATIENT
Start: 2022-10-17 | End: 2023-01-17

## 2022-10-17 RX ORDER — HYDROGEN PEROXIDE 3 %
20 SOLUTION, NON-ORAL MISCELLANEOUS DAILY
Qty: 90 CAPSULE | Refills: 1 | Status: SHIPPED | OUTPATIENT
Start: 2022-10-17 | End: 2023-01-17 | Stop reason: SDUPTHER

## 2022-10-17 RX ORDER — SOLIFENACIN SUCCINATE 10 MG/1
10 TABLET, FILM COATED ORAL DAILY
COMMUNITY
Start: 2022-07-18 | End: 2022-10-17 | Stop reason: SDUPTHER

## 2022-10-17 RX ORDER — THYROID 60 MG/1
60 TABLET ORAL
Qty: 90 TABLET | Refills: 1 | Status: SHIPPED | OUTPATIENT
Start: 2022-10-17 | End: 2023-01-17 | Stop reason: SDUPTHER

## 2022-10-17 RX ORDER — SOLIFENACIN SUCCINATE 10 MG/1
10 TABLET, FILM COATED ORAL DAILY
Qty: 90 TABLET | Refills: 1 | Status: SHIPPED | OUTPATIENT
Start: 2022-10-17 | End: 2023-01-17 | Stop reason: SDUPTHER

## 2022-10-17 RX ORDER — HYOSCYAMINE SULFATE 0.12 MG/1
0.12 TABLET SUBLINGUAL EVERY 4 HOURS PRN
Qty: 30 TABLET | Refills: 2 | Status: SHIPPED | OUTPATIENT
Start: 2022-10-17 | End: 2024-01-30 | Stop reason: SDUPTHER

## 2022-10-17 RX ORDER — DAPAGLIFLOZIN 5 MG/1
5 TABLET, FILM COATED ORAL DAILY
COMMUNITY
Start: 2022-09-19 | End: 2022-10-17 | Stop reason: SDUPTHER

## 2022-10-17 RX ORDER — FUROSEMIDE 20 MG/1
20 TABLET ORAL DAILY
COMMUNITY
Start: 2022-04-26 | End: 2023-01-17 | Stop reason: SDUPTHER

## 2022-10-17 RX ORDER — METFORMIN HYDROCHLORIDE 500 MG/1
500 TABLET, EXTENDED RELEASE ORAL DAILY
COMMUNITY
Start: 2021-10-05 | End: 2022-10-17 | Stop reason: SDUPTHER

## 2022-10-17 RX ORDER — FLUOXETINE 10 MG/1
10 CAPSULE ORAL DAILY
COMMUNITY
Start: 2022-07-18 | End: 2022-10-17

## 2022-10-17 NOTE — PROGRESS NOTES
Subjective:       Patient ID: Chiquis Horner is a 72 y.o. female.    Chief Complaint: Follow-up (States follow up ,states complaints,)    MADDISON Celestin chart abstracted  Presents alone to geriatrics clinic, provides own h/o reliably  Doing well overall  No new c/o  Recently drive to [place] to care for 89 yo mom wo was ill  Saw MD fly friend there who started her on weight loss program - Added Semaglutide (unsure of dose, it was in prefilled syringes labeled by MD office and [place] thyroid 60mg, reportedly had labs done and was told it was off, 2021 TSH here 2.1  Needs refill for armour thyroid, no reports today    DM - controlled, no hypoglycemia, did decrease her metformin to q day when added semaglutide  Elevated BMI - over 25 lbs down now over past year  -intentional, 2 lbs since 1/2022, stopped her Topamax  HTN - controlled  Depressed - well controlled      Functional assessment:  ADL/IADL = fully independent  Driving, no accidents  No falls  No cognitive concerns    Health Maintenance  Mammogram-7/2021, ordered  DEXA-7/2017, agrees to do later  PAP-ISAIAH/BSO, no history of abnormal  Colonoscopy-2/2014, Dr. Leyla Smith, rectal biopsy =ulcer    Care team  [facility]- 6/2021 L hip inj, has 10,2021 FU  Ophth - eye clinic    Problem list:  DM  HTN  HLD  OA right hand,CMC  GERD, Chronic  Depression  Urge Incontinence  Benign Tremor  Chronic left hip greater trochanteric bursitis, gluteus minimus tear, s/p bursa injection #1, 7/2021    Current Outpatient Medications   Medication Instructions    buPROPion (WELLBUTRIN XL) 300 mg, Oral, Daily    esomeprazole (NEXIUM) 20 mg, Oral, Daily    FARXIGA 5 mg, Oral, Daily    furosemide (LASIX) 20 mg, Oral, Daily    hyoscyamine (LEVSIN/SL) 0.125 mg, Sublingual, Every 4 hours PRN    metFORMIN (GLUCOPHAGE XR) 500 mg, Oral, Daily    solifenacin (VESICARE) 10 mg, Oral, Daily    thyroid (pork) (ARMOUR THYROID) 60 mg, Oral, Before breakfast             Objective:      Physical Exam   "  Vitals:    10/17/22 1416   BP: 138/78   BP Location: Right arm   Patient Position: Sitting   BP Method: Medium (Manual)   Pulse: 82   Resp: 18   Temp: 98.2 °F (36.8 °C)   TempSrc: Oral   SpO2: 100%   Weight: 93.5 kg (206 lb 2.1 oz)   Height: 5' 2.99" (1.6 m)       General - NAD, comfortable  HEENT- nl TM,EAC, josiah w/o redness  Neck - no node, mass ,thyromegaly  Respiratory - CTA BL, no distress  Cardiovascular - RRR, no murmur    Assessment:       DM  HTN  Depression  Elevated BMI      Plan:       Mammo  Labs,urine for micro  Refill armour thyroid  Will bring by bottles and syringes of meds for [place] for me to review  RTC 3 mos - foot exam and eye exam, DEXA        "

## 2022-10-20 ENCOUNTER — PATIENT MESSAGE (OUTPATIENT)
Dept: FAMILY MEDICINE | Facility: CLINIC | Age: 72
End: 2022-10-20
Payer: MEDICARE

## 2022-10-20 ENCOUNTER — LAB VISIT (OUTPATIENT)
Dept: LAB | Facility: HOSPITAL | Age: 72
End: 2022-10-20
Attending: FAMILY MEDICINE
Payer: MEDICARE

## 2022-10-20 DIAGNOSIS — I10 HYPERTENSION, UNSPECIFIED TYPE: ICD-10-CM

## 2022-10-20 DIAGNOSIS — E78.5 HYPERLIPIDEMIA, UNSPECIFIED HYPERLIPIDEMIA TYPE: ICD-10-CM

## 2022-10-20 DIAGNOSIS — E11.9 TYPE 2 DIABETES MELLITUS WITHOUT COMPLICATION, WITHOUT LONG-TERM CURRENT USE OF INSULIN: ICD-10-CM

## 2022-10-20 DIAGNOSIS — F32.A DEPRESSIVE DISORDER: ICD-10-CM

## 2022-10-20 LAB
ALBUMIN SERPL-MCNC: 4 GM/DL (ref 3.4–4.8)
ALBUMIN/GLOB SERPL: 1.4 RATIO (ref 1.1–2)
ALP SERPL-CCNC: 133 UNIT/L (ref 40–150)
ALT SERPL-CCNC: 28 UNIT/L (ref 0–55)
AST SERPL-CCNC: 23 UNIT/L (ref 5–34)
BILIRUBIN DIRECT+TOT PNL SERPL-MCNC: 0.7 MG/DL
BUN SERPL-MCNC: 12.9 MG/DL (ref 9.8–20.1)
CALCIUM SERPL-MCNC: 9.5 MG/DL (ref 8.4–10.2)
CHLORIDE SERPL-SCNC: 109 MMOL/L (ref 98–107)
CHOLEST SERPL-MCNC: 236 MG/DL
CHOLEST/HDLC SERPL: 5 {RATIO} (ref 0–5)
CO2 SERPL-SCNC: 25 MMOL/L (ref 23–31)
CREAT SERPL-MCNC: 0.87 MG/DL (ref 0.55–1.02)
CREAT UR-MCNC: 139.9 MG/DL (ref 47–110)
EST. AVERAGE GLUCOSE BLD GHB EST-MCNC: 102.5 MG/DL
GFR SERPLBLD CREATININE-BSD FMLA CKD-EPI: >60 MLS/MIN/1.73/M2
GLOBULIN SER-MCNC: 2.8 GM/DL (ref 2.4–3.5)
GLUCOSE SERPL-MCNC: 113 MG/DL (ref 82–115)
HBA1C MFR BLD: 5.2 %
HDLC SERPL-MCNC: 44 MG/DL (ref 35–60)
LDLC SERPL CALC-MCNC: 159 MG/DL (ref 50–140)
MICROALBUMIN UR-MCNC: 16.8 UG/ML
MICROALBUMIN/CREAT RATIO PNL UR: 12 MG/GM CR (ref 0–30)
POTASSIUM SERPL-SCNC: 4.2 MMOL/L (ref 3.5–5.1)
PROT SERPL-MCNC: 6.8 GM/DL (ref 5.8–7.6)
SODIUM SERPL-SCNC: 145 MMOL/L (ref 136–145)
TRIGL SERPL-MCNC: 163 MG/DL (ref 37–140)
TSH SERPL-ACNC: 2.13 UIU/ML (ref 0.35–4.94)
VLDLC SERPL CALC-MCNC: 33 MG/DL

## 2022-10-20 PROCEDURE — 83036 HEMOGLOBIN GLYCOSYLATED A1C: CPT

## 2022-10-20 PROCEDURE — 84443 ASSAY THYROID STIM HORMONE: CPT

## 2022-10-20 PROCEDURE — 80061 LIPID PANEL: CPT

## 2022-10-20 PROCEDURE — 36415 COLL VENOUS BLD VENIPUNCTURE: CPT

## 2022-10-20 PROCEDURE — 82043 UR ALBUMIN QUANTITATIVE: CPT

## 2022-10-20 PROCEDURE — 80053 COMPREHEN METABOLIC PANEL: CPT

## 2022-10-25 ENCOUNTER — PATIENT MESSAGE (OUTPATIENT)
Dept: FAMILY MEDICINE | Facility: CLINIC | Age: 72
End: 2022-10-25
Payer: MEDICARE

## 2022-10-26 ENCOUNTER — TELEPHONE (OUTPATIENT)
Dept: FAMILY MEDICINE | Facility: CLINIC | Age: 72
End: 2022-10-26
Payer: MEDICARE

## 2022-10-26 RX ORDER — ATORVASTATIN CALCIUM 10 MG/1
10 TABLET, FILM COATED ORAL DAILY
Qty: 90 TABLET | Refills: 1 | Status: SHIPPED | OUTPATIENT
Start: 2022-10-26 | End: 2023-01-17 | Stop reason: SDUPTHER

## 2022-10-26 NOTE — TELEPHONE ENCOUNTER
Disc lab results, has donnie on statin holiday, will start lipitor 10, also will rx Ozempic 0.5mg  -this is what she had been getting from the weight loss MD

## 2023-01-17 ENCOUNTER — OFFICE VISIT (OUTPATIENT)
Dept: FAMILY MEDICINE | Facility: CLINIC | Age: 73
End: 2023-01-17
Payer: MEDICARE

## 2023-01-17 VITALS
SYSTOLIC BLOOD PRESSURE: 136 MMHG | BODY MASS INDEX: 35.61 KG/M2 | OXYGEN SATURATION: 99 % | HEART RATE: 66 BPM | DIASTOLIC BLOOD PRESSURE: 78 MMHG | TEMPERATURE: 98 F | RESPIRATION RATE: 18 BRPM | HEIGHT: 63 IN | WEIGHT: 201 LBS

## 2023-01-17 DIAGNOSIS — I10 HYPERTENSION, UNSPECIFIED TYPE: ICD-10-CM

## 2023-01-17 DIAGNOSIS — Z11.59 ENCOUNTER FOR HEPATITIS C SCREENING TEST FOR LOW RISK PATIENT: ICD-10-CM

## 2023-01-17 DIAGNOSIS — Z12.31 BREAST CANCER SCREENING BY MAMMOGRAM: ICD-10-CM

## 2023-01-17 DIAGNOSIS — E11.9 TYPE 2 DIABETES MELLITUS WITHOUT COMPLICATION, WITHOUT LONG-TERM CURRENT USE OF INSULIN: Primary | ICD-10-CM

## 2023-01-17 LAB
EST. AVERAGE GLUCOSE BLD GHB EST-MCNC: 105.4 MG/DL
HBA1C MFR BLD: 5.3 %
HCV AB SERPL QL IA: NONREACTIVE

## 2023-01-17 PROCEDURE — 99214 OFFICE O/P EST MOD 30 MIN: CPT | Mod: PBBFAC | Performed by: FAMILY MEDICINE

## 2023-01-17 PROCEDURE — 86803 HEPATITIS C AB TEST: CPT | Performed by: FAMILY MEDICINE

## 2023-01-17 PROCEDURE — 36415 COLL VENOUS BLD VENIPUNCTURE: CPT | Performed by: FAMILY MEDICINE

## 2023-01-17 PROCEDURE — 83036 HEMOGLOBIN GLYCOSYLATED A1C: CPT | Performed by: FAMILY MEDICINE

## 2023-01-17 RX ORDER — SOLIFENACIN SUCCINATE 10 MG/1
10 TABLET, FILM COATED ORAL DAILY
Qty: 90 TABLET | Refills: 1 | Status: SHIPPED | OUTPATIENT
Start: 2023-01-17 | End: 2023-06-06 | Stop reason: SDUPTHER

## 2023-01-17 RX ORDER — TOPIRAMATE 25 MG/1
25 TABLET ORAL NIGHTLY
Qty: 90 TABLET | Refills: 1 | Status: SHIPPED | OUTPATIENT
Start: 2023-01-17 | End: 2023-12-29 | Stop reason: SDUPTHER

## 2023-01-17 RX ORDER — FUROSEMIDE 20 MG/1
20 TABLET ORAL DAILY
Qty: 30 TABLET | Refills: 1 | Status: SHIPPED | OUTPATIENT
Start: 2023-01-17

## 2023-01-17 RX ORDER — HYDROGEN PEROXIDE 3 %
20 SOLUTION, NON-ORAL MISCELLANEOUS DAILY
Qty: 90 CAPSULE | Refills: 1 | Status: SHIPPED | OUTPATIENT
Start: 2023-01-17 | End: 2023-06-06 | Stop reason: SDUPTHER

## 2023-01-17 RX ORDER — FLUCONAZOLE 150 MG/1
TABLET ORAL
Qty: 2 TABLET | Refills: 0 | Status: SHIPPED | OUTPATIENT
Start: 2023-01-17 | End: 2023-06-06

## 2023-01-17 RX ORDER — ATORVASTATIN CALCIUM 10 MG/1
10 TABLET, FILM COATED ORAL DAILY
Qty: 90 TABLET | Refills: 1 | Status: SHIPPED | OUTPATIENT
Start: 2023-01-17 | End: 2023-06-06

## 2023-01-17 RX ORDER — THYROID 60 MG/1
60 TABLET ORAL
Qty: 90 TABLET | Refills: 1 | Status: SHIPPED | OUTPATIENT
Start: 2023-01-17 | End: 2023-06-06 | Stop reason: SDUPTHER

## 2023-01-17 RX ORDER — BUPROPION HYDROCHLORIDE 300 MG/1
300 TABLET ORAL DAILY
Qty: 90 TABLET | Refills: 1 | Status: SHIPPED | OUTPATIENT
Start: 2023-01-17 | End: 2023-06-06 | Stop reason: SDUPTHER

## 2023-01-17 RX ORDER — METFORMIN HYDROCHLORIDE 500 MG/1
500 TABLET, EXTENDED RELEASE ORAL DAILY
Qty: 180 TABLET | Refills: 1 | Status: SHIPPED | OUTPATIENT
Start: 2023-01-17 | End: 2023-06-06 | Stop reason: SDUPTHER

## 2023-01-17 NOTE — PROGRESS NOTES
Subjective:       Patient ID: Chiquis Horner is a 72 y.o. female.    Chief Complaint: Follow-up, Hypertension, and Diabetes    HPI  Doing well overall    DM: well controlled  HTN: controlled  Elevated BMI - still with overall wt loss since on Ozempic but gained few over the holidays ,asking tor/s Topamax for appt    C/o vag itch burning, terrible yeast infection - 1st on Farxiga but wants to stop it, been on > 6 mos, OTC helping, not completely resolved       Functional assessment:  ADL/IADL = fully independent  Driving, no accidents  No falls  No cognitive concerns     Health Maintenance  Mammogram-7/2021, ordered  DEXA-7/2017, agrees to do later  PAP-ISAIAH/BSO, no history of abnormal  Colonoscopy-2/2014, Dr. Leyla Smith, rectal biopsy =ulcer     Care team  [facility]- 6/2021 L hip  - inj,prn  Ophth - eye clinic    Problem list:  DM  HTN  HLD  OA right hand,CMC  GERD, Chronic  Depression  Urge Incontinence  Benign Tremor  Chronic left hip greater trochanteric bursitis, gluteus minimus tear, s/p bursa injection #1, 7/2021    Current Outpatient Medications on File Prior to Visit   Medication Sig Dispense Refill    atorvastatin (LIPITOR) 10 MG tablet Take 1 tablet (10 mg total) by mouth once daily. 90 tablet 1    buPROPion (WELLBUTRIN XL) 300 MG 24 hr tablet Take 1 tablet (300 mg total) by mouth once daily. 90 tablet 1    esomeprazole (NEXIUM) 20 MG capsule Take 1 capsule (20 mg total) by mouth once daily. 90 capsule 1    FARXIGA 5 mg Tab tablet Take 1 tablet (5 mg total) by mouth once daily. 90 tablet 1    furosemide (LASIX) 20 MG tablet Take 20 mg by mouth once daily.      hyoscyamine (LEVSIN/SL) 0.125 mg Subl Place 1 tablet (0.125 mg total) under the tongue every 4 (four) hours as needed (abdominal pain). 30 tablet 2    metFORMIN (GLUCOPHAGE XR) 500 MG ER 24hr tablet Take 1 tablet (500 mg total) by mouth Daily. 180 tablet 1    semaglutide (OZEMPIC) 0.25 mg or 0.5 mg(2 mg/1.5 mL) pen injector Inject 0.5 mg  "into the skin every 7 days. 4 pen 5    solifenacin (VESICARE) 10 MG tablet Take 1 tablet (10 mg total) by mouth once daily. 90 tablet 1    thyroid, pork, (ARMOUR THYROID) 60 mg Tab Take 1 tablet (60 mg total) by mouth before breakfast. 90 tablet 1     No current facility-administered medications on file prior to visit.           Review of Systems  Constitutional: no fever,  weakness  Respiratory: no cough, wheezing, SOB  Cardiovascular: no CP, palpitations, occ mild LLE edema - chronic - no change  Gastrointestinal: no NVD, ABD pain, blood in stool, melena        Objective:      Physical Exam    Vitals:    01/17/23 0936   BP: 136/78   BP Location: Right arm   Patient Position: Sitting   BP Method: Large (Manual)   Pulse: 66   Resp: 18   Temp: 98.2 °F (36.8 °C)   TempSrc: Oral   SpO2: 99%   Weight: 91.2 kg (201 lb)   Height: 5' 3" (1.6 m)     General - NAD, comfortable, total 20lb loss on Ozempic  HEENT- nl TM,EAC, josiah w/o redness  Neck - no node, mass ,thyromegaly  Respiratory - CTA BL, no distress  Cardiovascular - RRR, trace left pedal edema  Gastrointestinal - soft NT, no mass, nl BS x 4     Protective Sensation (w/ 10 gram monofilament):  Right: Intact  Left: Intact    Visual Inspection:  BL dryness    Pedal Pulses:   Right: Present  Left: Present    Posterior tibialis:   Right:Present  Left: Present      Assessment:     1. Type 2 diabetes mellitus without complication, without long-term current use of insulin    2. Hypertension, unspecified type    3. BMI 35.0-35.9,adult    4. Encounter for hepatitis C screening test for low risk patient    5. Breast cancer screening by mammogram                  Plan:       Lab/mammo -declined DEXA now due to fear of cost  Diflucan  Start Topamax 25mg  Stop Farxiga  -cont other meds  Rtc 3 mos      Orders Placed This Encounter    Mammo Digital Screening Bilat w/ [facility]    Hepatitis C Antibody    Hemoglobin A1C    TSH    atorvastatin (LIPITOR) 10 MG tablet    buPROPion " (WELLBUTRIN XL) 300 MG 24 hr tablet    esomeprazole (NEXIUM) 20 MG capsule    furosemide (LASIX) 20 MG tablet    metFORMIN (GLUCOPHAGE XR) 500 MG ER 24hr tablet    semaglutide (OZEMPIC) 0.25 mg or 0.5 mg(2 mg/1.5 mL) pen injector    solifenacin (VESICARE) 10 MG tablet    thyroid, pork, (ARMOUR THYROID) 60 mg Tab    fluconazole (DIFLUCAN) 150 MG Tab    topiramate (TOPAMAX) 25 MG tablet

## 2023-04-20 ENCOUNTER — TELEPHONE (OUTPATIENT)
Dept: FAMILY MEDICINE | Facility: CLINIC | Age: 73
End: 2023-04-20
Payer: MEDICARE

## 2023-04-20 NOTE — TELEPHONE ENCOUNTER
Ms. Sim stated the Ozempic she was getting went up to $348.00 and was around $13.00. The pharmacy asked her to contact you to see if you could send in a substitution medication. I attempted to call the pharmacy to see what they have available for a substitute and was unable to get through to them.

## 2023-04-21 RX ORDER — DULAGLUTIDE 0.75 MG/.5ML
0.75 INJECTION, SOLUTION SUBCUTANEOUS
Qty: 4 PEN | Refills: 5 | Status: SHIPPED | OUTPATIENT
Start: 2023-04-21 | End: 2023-04-24

## 2023-04-24 ENCOUNTER — TELEPHONE (OUTPATIENT)
Dept: FAMILY MEDICINE | Facility: CLINIC | Age: 73
End: 2023-04-24
Payer: MEDICARE

## 2023-04-24 RX ORDER — LIRAGLUTIDE 6 MG/ML
0.6 INJECTION SUBCUTANEOUS DAILY
Qty: 3 ML | Refills: 2 | Status: SHIPPED | OUTPATIENT
Start: 2023-04-24 | End: 2023-06-06 | Stop reason: SDUPTHER

## 2023-04-24 NOTE — TELEPHONE ENCOUNTER
Pt appreciates you changing her Ozempic to Trulicity however it is still $158.42. The pharmacy suggested Victoza to her if you possibly want to go that route with her.

## 2023-06-06 ENCOUNTER — OFFICE VISIT (OUTPATIENT)
Dept: FAMILY MEDICINE | Facility: CLINIC | Age: 73
End: 2023-06-06
Payer: MEDICARE

## 2023-06-06 VITALS
RESPIRATION RATE: 18 BRPM | TEMPERATURE: 99 F | WEIGHT: 188.81 LBS | OXYGEN SATURATION: 99 % | HEIGHT: 63 IN | BODY MASS INDEX: 33.45 KG/M2 | HEART RATE: 67 BPM | SYSTOLIC BLOOD PRESSURE: 123 MMHG | DIASTOLIC BLOOD PRESSURE: 73 MMHG

## 2023-06-06 DIAGNOSIS — I10 HYPERTENSION, UNSPECIFIED TYPE: ICD-10-CM

## 2023-06-06 DIAGNOSIS — N95.9 MENOPAUSAL PROBLEM: ICD-10-CM

## 2023-06-06 DIAGNOSIS — Z12.31 SCREENING MAMMOGRAM FOR BREAST CANCER: ICD-10-CM

## 2023-06-06 DIAGNOSIS — E78.5 HYPERLIPIDEMIA, UNSPECIFIED HYPERLIPIDEMIA TYPE: ICD-10-CM

## 2023-06-06 DIAGNOSIS — E11.9 TYPE 2 DIABETES MELLITUS WITHOUT COMPLICATION, WITHOUT LONG-TERM CURRENT USE OF INSULIN: Primary | ICD-10-CM

## 2023-06-06 LAB — TSH SERPL-ACNC: 1.1 UIU/ML (ref 0.35–4.94)

## 2023-06-06 PROCEDURE — 84443 ASSAY THYROID STIM HORMONE: CPT | Performed by: FAMILY MEDICINE

## 2023-06-06 PROCEDURE — 99215 OFFICE O/P EST HI 40 MIN: CPT | Mod: PBBFAC | Performed by: FAMILY MEDICINE

## 2023-06-06 RX ORDER — TOPIRAMATE 50 MG/1
50 TABLET, FILM COATED ORAL NIGHTLY
Qty: 90 TABLET | Refills: 1 | Status: SHIPPED | OUTPATIENT
Start: 2023-06-06

## 2023-06-06 RX ORDER — LIRAGLUTIDE 6 MG/ML
0.6 INJECTION SUBCUTANEOUS DAILY
Qty: 3 ML | Refills: 2 | Status: SHIPPED | OUTPATIENT
Start: 2023-06-06 | End: 2023-09-26 | Stop reason: SDUPTHER

## 2023-06-06 RX ORDER — HYDROGEN PEROXIDE 3 %
20 SOLUTION, NON-ORAL MISCELLANEOUS DAILY
Qty: 90 CAPSULE | Refills: 1 | Status: SHIPPED | OUTPATIENT
Start: 2023-06-06 | End: 2024-02-22

## 2023-06-06 RX ORDER — PEN NEEDLE, DIABETIC 32GX 5/32"
NEEDLE, DISPOSABLE MISCELLANEOUS
COMMUNITY
Start: 2023-04-26

## 2023-06-06 RX ORDER — METFORMIN HYDROCHLORIDE 500 MG/1
500 TABLET, EXTENDED RELEASE ORAL DAILY
Qty: 180 TABLET | Refills: 1 | Status: SHIPPED | OUTPATIENT
Start: 2023-06-06

## 2023-06-06 RX ORDER — THYROID 60 MG/1
60 TABLET ORAL
Qty: 90 TABLET | Refills: 1 | Status: SHIPPED | OUTPATIENT
Start: 2023-06-06 | End: 2024-06-05

## 2023-06-06 RX ORDER — BUPROPION HYDROCHLORIDE 300 MG/1
300 TABLET ORAL DAILY
Qty: 90 TABLET | Refills: 1 | Status: SHIPPED | OUTPATIENT
Start: 2023-06-06 | End: 2024-02-22

## 2023-06-06 RX ORDER — SOLIFENACIN SUCCINATE 10 MG/1
10 TABLET, FILM COATED ORAL DAILY
Qty: 90 TABLET | Refills: 1 | Status: SHIPPED | OUTPATIENT
Start: 2023-06-06 | End: 2024-02-22

## 2023-06-06 NOTE — PROGRESS NOTES
"Subjective     Patient ID: Chiquis Horner is a 72 y.o. female.    Chief Complaint: Follow-up, Diabetes, and Hypertension    HPI  Doing great overall, babysitting grandkids, lives alone, active    DM:  change to Victoza, CBG< 160, lost another 10 pounds  HTN: controlled w/o meds  HLD: off Lipitor for mos due to ARSH, same with Crestor  Elevated BMI: changed Ozempic to Victoza couple months ago due to cost and lost 10 more pounds, did increase her Topamax 25 to 50mg    C/o benign forgetfulness-  occ forgets name of a restaurant, etc and later it comes to her, still fully independent, no issues with naming objects, conversation flow, remembering important dates, appts    Health Maintenance  Mammogram-7/2021, ordered  DEXA-7/2017, agrees to do later  PAP-ISAIAH/BSO, no history of abnormal  Colonoscopy-2/2014, Dr. Leyla Smith, rectal biopsy =ulcer     Care team  [facility]- 6/2021 L hip  - inj,prn  Ophth - eye clinic    Problem list:  DM  HTN  HLD  OA right hand,CMC  GERD, Chronic  Depression  Urge Incontinence  Benign Tremor  Chronic left hip greater trochanteric bursitis, gluteus minimus tear, s/p bursa injection #1, 7/2021    Current Outpatient Medications   Medication Instructions    buPROPion (WELLBUTRIN XL) 300 mg, Oral, Daily    esomeprazole (NEXIUM) 20 mg, Oral, Daily    furosemide (LASIX) 20 mg, Oral, Daily    hyoscyamine (LEVSIN/SL) 0.125 mg, Sublingual, Every 4 hours PRN    metFORMIN (GLUCOPHAGE XR) 500 mg, Oral, Daily    solifenacin (VESICARE) 10 mg, Oral, Daily    TECHLITE PEN NEEDLE 32 gauge x 5/32" Ndle USE 1 NEEDLE DAILY    thyroid (pork) (ARMOUR THYROID) 60 mg, Oral, Before breakfast    topiramate (TOPAMAX) 25 mg, Oral, Nightly    VICTOZA 2-YUMIKO 0.6 mg, Subcutaneous, Daily         ROS  Constitutional: no fever, fatigue, weakness  ENT: no sore throat, ear pain,  nasal congestion/drainage  Respiratory: no cough, wheezing, SOB  Cardiovascular: no CP, palpitations, edema  Gastrointestinal: no NVD, ABD " "pain, blood in stool, melena  Genitourinary: no dysuria, frequency, urgency, hematuria    PE  Vitals:    06/06/23 0844   BP: 123/73   BP Location: Right arm   Patient Position: Sitting   BP Method: Large (Automatic)   Pulse: 67   Resp: 18   Temp: 98.8 °F (37.1 °C)   TempSrc: Oral   SpO2: 99%   Weight: 85.6 kg (188 lb 12.8 oz)   Height: 5' 3" (1.6 m)     General - NAD, comfortable  HEENT- nl TM,EAC, josiah w/o redness  Neck - no node, mass ,thyromegaly  Respiratory - CTA BL, no distress  Cardiovascular - RRR, no murmur  Gastrointestinal - soft NT, no mass, nl BS x 4     Assessment  1. Type 2 diabetes mellitus without complication, without long-term current use of insulin    2. Hypertension, unspecified type    3. Hyperlipidemia, unspecified hyperlipidemia type    4.      Elevated BMI    Plan  Continue current meds, may need pravastatin  Lab  Mammo  DEXA  oK to stay on Topamax 50mg   RTC 3 mos -CUS recommended, pt will consider    Orders Placed This Encounter    Mammo Digital Screening Bilat w/ Kofi    DXA Bone Density Axial Skeleton 1 or more sites    Comprehensive Metabolic Panel    Hemoglobin A1C    Lipid Panel    TSH    Ambulatory referral/consult to Ophthalmology    buPROPion (WELLBUTRIN XL) 300 MG 24 hr tablet    esomeprazole (NEXIUM) 20 MG capsule    liraglutide 0.6 mg/0.1 mL, 18 mg/3 mL, subq PNIJ (VICTOZA 2-YUMIKO) 0.6 mg/0.1 mL (18 mg/3 mL) PnIj pen    metFORMIN (GLUCOPHAGE XR) 500 MG ER 24hr tablet    solifenacin (VESICARE) 10 MG tablet    thyroid, pork, (ARMOUR THYROID) 60 mg Tab    topiramate (TOPAMAX) 50 MG tablet                        "

## 2023-06-09 ENCOUNTER — TELEPHONE (OUTPATIENT)
Dept: FAMILY MEDICINE | Facility: CLINIC | Age: 73
End: 2023-06-09
Payer: MEDICARE

## 2023-06-09 RX ORDER — PRAVASTATIN SODIUM 20 MG/1
20 TABLET ORAL DAILY
Qty: 90 TABLET | Refills: 1 | Status: SHIPPED | OUTPATIENT
Start: 2023-06-09 | End: 2024-06-08

## 2023-09-26 ENCOUNTER — OFFICE VISIT (OUTPATIENT)
Dept: FAMILY MEDICINE | Facility: CLINIC | Age: 73
End: 2023-09-26
Payer: MEDICARE

## 2023-09-26 ENCOUNTER — CLINICAL SUPPORT (OUTPATIENT)
Dept: FAMILY MEDICINE | Facility: CLINIC | Age: 73
End: 2023-09-26
Payer: MEDICARE

## 2023-09-26 ENCOUNTER — TELEPHONE (OUTPATIENT)
Dept: FAMILY MEDICINE | Facility: CLINIC | Age: 73
End: 2023-09-26

## 2023-09-26 VITALS
DIASTOLIC BLOOD PRESSURE: 74 MMHG | HEART RATE: 72 BPM | TEMPERATURE: 98 F | OXYGEN SATURATION: 99 % | RESPIRATION RATE: 18 BRPM | WEIGHT: 189 LBS | BODY MASS INDEX: 33.49 KG/M2 | SYSTOLIC BLOOD PRESSURE: 130 MMHG | HEIGHT: 63 IN

## 2023-09-26 DIAGNOSIS — E11.9 TYPE 2 DIABETES MELLITUS WITHOUT COMPLICATION, WITHOUT LONG-TERM CURRENT USE OF INSULIN: Primary | ICD-10-CM

## 2023-09-26 DIAGNOSIS — E78.5 HYPERLIPIDEMIA, UNSPECIFIED HYPERLIPIDEMIA TYPE: ICD-10-CM

## 2023-09-26 DIAGNOSIS — I10 HYPERTENSION, UNSPECIFIED TYPE: ICD-10-CM

## 2023-09-26 LAB
CHOLEST SERPL-MCNC: 242 MG/DL
CHOLEST/HDLC SERPL: 5 {RATIO} (ref 0–5)
EST. AVERAGE GLUCOSE BLD GHB EST-MCNC: 96.8 MG/DL
HBA1C MFR BLD: 5 %
HDLC SERPL-MCNC: 53 MG/DL (ref 35–60)
LDLC SERPL CALC-MCNC: 163 MG/DL (ref 50–140)
TRIGL SERPL-MCNC: 129 MG/DL (ref 37–140)
VLDLC SERPL CALC-MCNC: 26 MG/DL

## 2023-09-26 PROCEDURE — 83036 HEMOGLOBIN GLYCOSYLATED A1C: CPT | Performed by: FAMILY MEDICINE

## 2023-09-26 PROCEDURE — 36415 COLL VENOUS BLD VENIPUNCTURE: CPT | Performed by: FAMILY MEDICINE

## 2023-09-26 PROCEDURE — G0008 ADMIN INFLUENZA VIRUS VAC: HCPCS | Mod: PBBFAC

## 2023-09-26 PROCEDURE — 92228 IMG RTA DETC/MNTR DS PHY/QHP: CPT | Mod: PBBFAC | Performed by: FAMILY MEDICINE

## 2023-09-26 PROCEDURE — 90694 VACC AIIV4 NO PRSRV 0.5ML IM: CPT | Mod: PBBFAC

## 2023-09-26 PROCEDURE — 80061 LIPID PANEL: CPT | Performed by: FAMILY MEDICINE

## 2023-09-26 PROCEDURE — 99215 OFFICE O/P EST HI 40 MIN: CPT | Mod: PBBFAC,25 | Performed by: FAMILY MEDICINE

## 2023-09-26 RX ORDER — LIRAGLUTIDE 6 MG/ML
1.2 INJECTION SUBCUTANEOUS DAILY
Qty: 3 ML | Refills: 2 | Status: SHIPPED | OUTPATIENT
Start: 2023-09-26 | End: 2024-01-30 | Stop reason: SDUPTHER

## 2023-09-26 RX ORDER — BUSPIRONE HYDROCHLORIDE 5 MG/1
5 TABLET ORAL 2 TIMES DAILY PRN
Qty: 180 TABLET | Refills: 1 | Status: SHIPPED | OUTPATIENT
Start: 2023-09-26

## 2023-09-26 RX ADMIN — INFLUENZA A VIRUS A/VICTORIA/4897/2022 IVR-238 (H1N1) ANTIGEN (FORMALDEHYDE INACTIVATED), INFLUENZA A VIRUS A/DARWIN/6/2021 IVR-227 (H3N2) ANTIGEN (FORMALDEHYDE INACTIVATED), INFLUENZA B VIRUS B/AUSTRIA/1359417/2021 BVR-26 ANTIGEN (FORMALDEHYDE INACTIVATED), INFLUENZA B VIRUS B/PHUKET/3073/2013 BVR-1B ANTIGEN (FORMALDEHYDE INACTIVATED) 0.5 ML: 15; 15; 15; 15 INJECTION, SUSPENSION INTRAMUSCULAR at 09:09

## 2023-09-26 NOTE — TELEPHONE ENCOUNTER
Please inform cholesterol went up to 242, I would like her to ad that medicine we touched about -Zetia, is it ok?

## 2023-09-26 NOTE — PROGRESS NOTES
"Subjective     Patient ID: Chiquis Horner is a 73 y.o. female.    Chief Complaint: Follow-up, Hypertension, and Diabetes    HPI    DM - no recent CBG off diet a little due to stress with mom's recent CA diagnosis., weight loss stalled, asking to increase Victoza  HTN - controlled, no CP SOB light headed dizziness  HLD - adherent,  - on pravachol 20 added 6/2023, hesitant to increase due to ARSH  Anxiety/Depression - depression controlled, anxiety flared lately situational with moms illness    No new c/o    Health Maintenance  Mammogram-7/2021, ordered  DEXA-7/2017, ordered  PAP-ISAIAH/BSO, no history of abnormal  Colonoscopy-2/2014, Dr. Leyla Smith, rectal biopsy =ulcer     Care team  [facility]- 6/2021 L hip  - inj,prn  Ophth - eye clinic    Problem list:  DM  HTN  HLD  OA right hand,CMC  GERD, Chronic  Depression  Urge Incontinence  Benign Tremor  Chronic left hip greater trochanteric bursitis, gluteus minimus tear, s/p bursa injection #1, 7/2021      Current Outpatient Medications   Medication Instructions    buPROPion (WELLBUTRIN XL) 300 mg, Oral, Daily    esomeprazole (NEXIUM) 20 mg, Oral, Daily    furosemide (LASIX) 20 mg, Oral, Daily    hyoscyamine (LEVSIN/SL) 0.125 mg, Sublingual, Every 4 hours PRN    metFORMIN (GLUCOPHAGE XR) 500 mg, Oral, Daily    pravastatin (PRAVACHOL) 20 mg, Oral, Daily, For cholesterol    solifenacin (VESICARE) 10 mg, Oral, Daily    TECHLITE PEN NEEDLE 32 gauge x 5/32" Ndle USE 1 NEEDLE DAILY    thyroid (pork) (ARMOUR THYROID) 60 mg, Oral, Before breakfast    topiramate (TOPAMAX) 50 mg, Oral, Nightly    VICTOZA 2-YUMIKO 0.6 mg, Subcutaneous, Daily         Jennifer  Respiratory: no cough, wheezing, SOB  Cardiovascular: no CP, palpitations, edema  Gastrointestinal: no NVD, ABD pain, blood in stool, melena      PE  Vitals:    09/26/23 0805   BP: 130/74   BP Location: Right arm   Patient Position: Sitting   BP Method: Large (Manual)   Pulse: 72   Resp: 18   Temp: 98.4 °F (36.9 °C) " "  TempSrc: Oral   SpO2: 99%   Weight: 85.7 kg (189 lb)   Height: 5' 3" (1.6 m)     General - NAD, comfortable  HEENT- nl TM,EAC, josiah w/o redness  Neck - no node, mass ,thyromegaly  Respiratory - CTA BL, no distress  Cardiovascular - RRR, no murmur,edema, bruit    Assessment  1. Type 2 diabetes mellitus without complication, without long-term current use of insulin    2. Hypertension, unspecified type    3. Hyperlipidemia, unspecified hyperlipidemia type    4.     Anxiety    Plan  Lab  Mammo  # given to schedule  Eye exam today  Trial Buspar for anxiety, may use prn or  daily  Consider Zetia  Increase Victoza 0.6 to 1.2  Flu shot  Rtc 3 mos -FIT 2/2024    Orders Placed This Encounter    Hemoglobin A1C    Lipid Panel    Diabetic Eye Screening Photo    liraglutide 0.6 mg/0.1 mL, 18 mg/3 mL, subq PNIJ (VICTOZA 2-YUMIKO) 0.6 mg/0.1 mL (18 mg/3 mL) PnIj pen    busPIRone (BUSPAR) 5 MG Tab    influenza 65up-adj (QUADRIVALENT ADJUVANTED PF) vaccine 0.5 mL                   "

## 2023-10-02 NOTE — PROGRESS NOTES
Chiquis Horner is a 73 y.o. female here for a diabetic eye screening with non-dilated fundus photos per Dr. Collins .    Patient cooperative?: Yes  Small pupils?: No  Last eye exam: unknown    For exam results, see Encounter Report.

## 2023-12-29 RX ORDER — TOPIRAMATE 25 MG/1
25 TABLET ORAL NIGHTLY
Qty: 90 TABLET | Refills: 1 | Status: SHIPPED | OUTPATIENT
Start: 2023-12-29

## 2024-01-30 ENCOUNTER — OFFICE VISIT (OUTPATIENT)
Dept: FAMILY MEDICINE | Facility: CLINIC | Age: 74
End: 2024-01-30
Payer: MEDICARE

## 2024-01-30 VITALS
RESPIRATION RATE: 18 BRPM | TEMPERATURE: 98 F | DIASTOLIC BLOOD PRESSURE: 64 MMHG | BODY MASS INDEX: 34.55 KG/M2 | HEIGHT: 63 IN | OXYGEN SATURATION: 100 % | SYSTOLIC BLOOD PRESSURE: 136 MMHG | WEIGHT: 195 LBS | HEART RATE: 75 BPM

## 2024-01-30 DIAGNOSIS — B00.1 COLD SORE: ICD-10-CM

## 2024-01-30 DIAGNOSIS — Z12.11 SCREEN FOR COLON CANCER: ICD-10-CM

## 2024-01-30 DIAGNOSIS — N95.9 MENOPAUSAL PROBLEM: ICD-10-CM

## 2024-01-30 DIAGNOSIS — I10 HYPERTENSION, UNSPECIFIED TYPE: ICD-10-CM

## 2024-01-30 DIAGNOSIS — Z12.31 SCREENING MAMMOGRAM FOR BREAST CANCER: ICD-10-CM

## 2024-01-30 DIAGNOSIS — E78.5 HYPERLIPIDEMIA, UNSPECIFIED HYPERLIPIDEMIA TYPE: ICD-10-CM

## 2024-01-30 DIAGNOSIS — E11.9 TYPE 2 DIABETES MELLITUS WITHOUT COMPLICATION, WITHOUT LONG-TERM CURRENT USE OF INSULIN: Primary | ICD-10-CM

## 2024-01-30 LAB
ALBUMIN SERPL-MCNC: 4 G/DL (ref 3.4–4.8)
ALBUMIN/GLOB SERPL: 1.2 RATIO (ref 1.1–2)
ALP SERPL-CCNC: 115 UNIT/L (ref 40–150)
ALT SERPL-CCNC: 15 UNIT/L (ref 0–55)
APPEARANCE UR: ABNORMAL
AST SERPL-CCNC: 16 UNIT/L (ref 5–34)
BACTERIA #/AREA URNS AUTO: ABNORMAL /HPF
BILIRUB SERPL-MCNC: 0.5 MG/DL
BILIRUB UR QL STRIP.AUTO: NEGATIVE
BUN SERPL-MCNC: 14.9 MG/DL (ref 9.8–20.1)
CALCIUM SERPL-MCNC: 9.5 MG/DL (ref 8.4–10.2)
CHLORIDE SERPL-SCNC: 112 MMOL/L (ref 98–107)
CHOLEST SERPL-MCNC: 198 MG/DL
CHOLEST/HDLC SERPL: 4 {RATIO} (ref 0–5)
CO2 SERPL-SCNC: 25 MMOL/L (ref 23–31)
COLOR UR AUTO: YELLOW
CREAT SERPL-MCNC: 0.89 MG/DL (ref 0.55–1.02)
CREAT UR-MCNC: 150.9 MG/DL (ref 45–106)
EST. AVERAGE GLUCOSE BLD GHB EST-MCNC: 93.9 MG/DL
GFR SERPLBLD CREATININE-BSD FMLA CKD-EPI: >60 MLS/MIN/1.73/M2
GLOBULIN SER-MCNC: 3.4 GM/DL (ref 2.4–3.5)
GLUCOSE SERPL-MCNC: 91 MG/DL (ref 82–115)
GLUCOSE UR QL STRIP.AUTO: NORMAL
HBA1C MFR BLD: 4.9 %
HDLC SERPL-MCNC: 55 MG/DL (ref 35–60)
HYALINE CASTS #/AREA URNS LPF: ABNORMAL /LPF
KETONES UR QL STRIP.AUTO: NEGATIVE
LDLC SERPL CALC-MCNC: 124 MG/DL (ref 50–140)
LEUKOCYTE ESTERASE UR QL STRIP.AUTO: 500
MICROALBUMIN UR-MCNC: 16 UG/ML
MICROALBUMIN/CREAT RATIO PNL UR: 10.6 MG/GM CR (ref 0–30)
MUCOUS THREADS URNS QL MICRO: ABNORMAL /LPF
NITRITE UR QL STRIP.AUTO: ABNORMAL
PH UR STRIP.AUTO: 6.5 [PH]
POTASSIUM SERPL-SCNC: 3.7 MMOL/L (ref 3.5–5.1)
PROT SERPL-MCNC: 7.4 GM/DL (ref 5.8–7.6)
PROT UR QL STRIP.AUTO: ABNORMAL
RBC #/AREA URNS AUTO: ABNORMAL /HPF
RBC UR QL AUTO: NEGATIVE
SODIUM SERPL-SCNC: 144 MMOL/L (ref 136–145)
SP GR UR STRIP.AUTO: 1.02 (ref 1–1.03)
SQUAMOUS #/AREA URNS LPF: ABNORMAL /HPF
TRIGL SERPL-MCNC: 93 MG/DL (ref 37–140)
UROBILINOGEN UR STRIP-ACNC: NORMAL
VLDLC SERPL CALC-MCNC: 19 MG/DL
WBC #/AREA URNS AUTO: ABNORMAL /HPF

## 2024-01-30 PROCEDURE — 82043 UR ALBUMIN QUANTITATIVE: CPT | Performed by: FAMILY MEDICINE

## 2024-01-30 PROCEDURE — 80061 LIPID PANEL: CPT | Performed by: FAMILY MEDICINE

## 2024-01-30 PROCEDURE — 83036 HEMOGLOBIN GLYCOSYLATED A1C: CPT | Performed by: FAMILY MEDICINE

## 2024-01-30 PROCEDURE — 87086 URINE CULTURE/COLONY COUNT: CPT | Performed by: FAMILY MEDICINE

## 2024-01-30 PROCEDURE — 81001 URINALYSIS AUTO W/SCOPE: CPT | Performed by: FAMILY MEDICINE

## 2024-01-30 PROCEDURE — 36415 COLL VENOUS BLD VENIPUNCTURE: CPT | Performed by: FAMILY MEDICINE

## 2024-01-30 PROCEDURE — 80053 COMPREHEN METABOLIC PANEL: CPT | Performed by: FAMILY MEDICINE

## 2024-01-30 PROCEDURE — 99215 OFFICE O/P EST HI 40 MIN: CPT | Mod: PBBFAC | Performed by: FAMILY MEDICINE

## 2024-01-30 RX ORDER — LIRAGLUTIDE 6 MG/ML
1.2 INJECTION SUBCUTANEOUS DAILY
Qty: 3 ML | Refills: 5 | Status: SHIPPED | OUTPATIENT
Start: 2024-01-30

## 2024-01-30 RX ORDER — ACYCLOVIR 50 MG/G
CREAM TOPICAL
Qty: 5 G | Refills: 2 | Status: SHIPPED | OUTPATIENT
Start: 2024-01-30

## 2024-01-30 RX ORDER — HYOSCYAMINE SULFATE 0.12 MG/1
0.12 TABLET SUBLINGUAL EVERY 4 HOURS PRN
Qty: 30 TABLET | Refills: 2 | Status: SHIPPED | OUTPATIENT
Start: 2024-01-30

## 2024-01-30 NOTE — PROGRESS NOTES
8/2/2018      RE: Alexys Medrano  [address]  [facility] [address]       Pediatric Cardiology Visit    Patient:  Alexys Medrano MRN:  [id number]   YOB: 2001 Age:  17  year old 1  month old   Date of Visit:  Aug 2, 2018 PCP:  Negrita Ortiz MD     Dear Dr. Ortiz:    We saw Alexys Medrano at the [facility] on Aug 2, 2018 in consultation at your request for abnormal ecg and echo.   He was seen in clinic with his mother today. He is a 17 year old male, previously healthy, who was seen for routine sports physical, at which time an ecg was done which showed sinus bradycardia, rate of 50, LVH, anterior ST elevation, and anterior t-wave changes. Subsequent echocardiogram, showed left ventricular hypertrophy with increased LV mass.  He was seen one year ago and had a holter that was normal at that time. He was lost to follow-up until having leg cellulitis last month (7/2018), needing IV abx and cardiology was consulted at that time.    He was re-echoed and an exercise stress test was performed at that time.  Echo showed thicker left ventricular wall compared to 9 months ago and EKG had signs of strain consistent with LVH.    Since last seen in clinic, he is active with football and basketball, keeps up with peers without difficulty. He has good energy. He does a lot of training/running but not much weight lifting. He denies any exertional chest pain, no tachycardia, palpitations, shortness of breath, presyncope or syncope. He has no history of hypertension previously. He denies use of any dietary/strength supplements. Comprehensive review of systems is otherwise negative today.     Past medical history:    Born full term, birth weight 7 pounds 15 ounces  No chronic medical issues, no hospitalizations or surgeries in past      He has a current medication list which includes the following prescription(s): acetaminophen. Hehas No  "Subjective     Patient ID: Chiquis Horner is a 73 y.o. female.    Chief Complaint: Follow-up, Diabetes, Hypertension, and No food insecurity    HPI  Grieving loss of her mother and her pet dog over last few months but doing OK, no c/o  Had been going through a lot so didn't get Mammo done  Remains very active fully independent, strong fly support, still has Buspar we added 9/2023, not using daily      DM - home CBG< 180,adherent, no hypoglycemia, increased Victoza 0.6 to 1.2 9/2023 to help with weight loss  HTN - no home BP log today, controlled, little high today   HLD - not at goal of moderate intensity statin due to pt c/o of achiness and hesitant to increase, using about 5d/week, tried to be more faithful over last few months  High BMI - weight loss stalled after all her stress, unsure if doing topamax 25 and 50, fill data suggests both, she will check at home and let us know    Health Maintenance  Mammogram-7/2021, ordered  DEXA-7/2017, ordered  PAP-ISAIAH/BSO, no history of abnormal  Colonoscopy-2/2014, Dr. Leyla Smith, rectal biopsy =ulcer, declines repeat, understands risks     Care team  [facility]- 6/2021 L hip  - inj,prn  Ophth - eye clinic    Problem list:  DM  HTN  HLD  OA right hand,CMC  GERD, Chronic  Depression  Urge Incontinence  Benign Tremor  Chronic left hip greater trochanteric bursitis, gluteus minimus tear, s/p bursa injection #1, 7/2021      Current Outpatient Medications   Medication Instructions    buPROPion (WELLBUTRIN XL) 300 mg, Oral, Daily    busPIRone (BUSPAR) 5 mg, Oral, 2 times daily PRN    esomeprazole (NEXIUM) 20 mg, Oral, Daily    furosemide (LASIX) 20 mg, Oral, Daily    hyoscyamine (LEVSIN/SL) 0.125 mg, Sublingual, Every 4 hours PRN    metFORMIN (GLUCOPHAGE XR) 500 mg, Oral, Daily    pravastatin (PRAVACHOL) 20 mg, Oral, Daily, For cholesterol    solifenacin (VESICARE) 10 mg, Oral, Daily    TECHLITE PEN NEEDLE 32 gauge x 5/32" Ndle USE 1 NEEDLE DAILY    thyroid (pork) (ARMOUR " "Known Allergies.    Family and social history:  Lives with mom, step dad, 5 siblings, and his 12 month old baby girl he has not seen since January, but to his knowledge has no medical problems. He is entering 11th grade. Works at [employer] and plays competitive basketball.     Family history negative for congenital heart disease, positive for 2 maternal cousins who  suddenly, one at age 29 and one at age 32 of \"heart attacks\" One of the cousins' mother had a pacemaker/ICD placed. Mom does not think there is family history of hypertrophic cardiomyopathy - however she states that it was recommended that she has genetic testing for her heart. Mom has had echocardiogram that was normal by her report.     Physical exam:  His height is 1.702 m (5' 7.01\") and weight is 61.5 kg (135 lb 9.3 oz). His blood pressure is 140/74 and his pulse is 48 (abnormal). His respiration is 20 and oxygen saturation is 100%.   His body mass index is 21.23 kg/(m^2).  His body surface area is 1.71 meters squared.  Growth percentiles are 50% for weight and 32% for height.  Alexys is alert, interactive, in no distress.  Lungs are clear with easy work of breathing.  Heart is regular with normal S1, physiologically split S2, and no murmur.  Abdomen is soft without hepatomegaly.  Extremities are warm and well-perfused with no edema or cyanosis, normal upper and lower extremity pulses without delays.     Blood pressure percentiles are 98 % systolic and 77 % diastolic based on the 2017 AAP Clinical Practice Guideline. Blood pressure percentile targets: 90: 129/80, 95: 133/83, 95 + 12 mmH/95. This reading is in the Stage 2 hypertension range (BP >= 140/90).    ECG from 2017, which was showed sinus  bradycardia, rate of 50, LVH, anterior ST elevation, and anterior twave changes.. I reviewed his echo from 17, which showed concentric left ventricular hypertrophy, LV mass index of 54 gm/m2.7 (upper limits normal is 40.7), normal " "THYROID) 60 mg, Oral, Before breakfast    topiramate (TOPAMAX) 50 mg, Oral, Nightly    topiramate (TOPAMAX) 25 mg, Oral, Nightly    VICTOZA 2-YUMIKO 1.2 mg, Subcutaneous, Daily         ROS  Respiratory: no cough, wheezing, SOB  Cardiovascular: no CP, palpitations, edema  Gastrointestinal: no NVD, ABD pain, blood in stool, melena  Genitourinary: no dysuria,hematuria    PE  Vitals:    01/30/24 0822   BP: (!) 142/68   BP Location: Right arm   Patient Position: Sitting   BP Method: Large (Manual)   Pulse: 75   Resp: 18   Temp: 98.2 °F (36.8 °C)   TempSrc: Oral   SpO2: 100%   Weight: 88.5 kg (195 lb)   Height: 5' 3" (1.6 m)     General - NAD, comfortable  HEENT- nl TM,EAC, josiah w/o redness  Neck - no node, mass ,thyromegaly  Respiratory - CTA BL, no distress  Cardiovascular - RRR, ?1/6 sys murmur left sternal border w/o rad, no bruit, no edema  Gastrointestinal - soft NT, no mass, nl BS x 4       Assessment  1. Type 2 diabetes mellitus without complication, without long-term current use of insulin    2. Hypertension, unspecified type    3. Hyperlipidemia, unspecified hyperlipidemia type                    Plan  Continue current meds  FIT  Lab, urine  Mammo/DEXA  R/b/SE covid, Tdap and RSV vaccine disc and understood, may get at pharmacy if interested  Rtc 3 mos  -foot exam, baseline EKG    Orders Placed This Encounter    DXA Bone Density Axial Skeleton 1 or more sites    Mammo Digital Screening Bilat w/ Kofi    Comprehensive Metabolic Panel    Hemoglobin A1C    Lipid Panel    Microalbumin/Creatinine Ratio, Urine    Urinalysis, Reflex to Urine Culture    OCCULT BLOOD FECAL IMMUNOASSAY    hyoscyamine (LEVSIN/SL) 0.125 mg Subl    liraglutide 0.6 mg/0.1 mL, 18 mg/3 mL, subq PNIJ (VICTOZA 2-YUMIKO) 0.6 mg/0.1 mL (18 mg/3 mL) PnIj pen       " LV systolic function, LV EF 60-65%, estimated RV pressure 17mmHg +RAP.     9/14/18 Holter:  Total ventricular ectopic beats = 0 (VE burden = 0%) with 0 isolated ventricular ectopic beats, 0 couplets and 0 runs.  Total supraventricular ectopic beats = 1 (SVE burden = <1%) with 1 isolated supraventricular ectopic beats, 0 couplets and 0 runs.   This is a normal Holter.     Echo 7/20/18:  There is mild left ventricular hypertrophy. The left and right ventricles have normal chamber size and systolic function. The posterior wall thickness Z- score is >2. Ventricular septum end-diastolic thickness Z-score is >2. Normal left ventricular systolic function. The calculated biplane left ventricular ejection fraction is 70 %. LV global longitudinal strain -22.7 % (Normal is <-18%). Normal left ventricular filling Doppler pattern and Tissue Doppler velocities. Normal TAPSE 2.72.    [place] Z-Scores (Measurements & Calculations)  Measurement NameValue      Z-ScorePredictedNormal Range  IVSd(MM)        1.3 cm     2.5    0.93     0.65 - 1.21  LVIDd(MM)       4.4 cm     -1.3   4.9      4.2 - 5.6  LVIDs(MM)       2.3 cm     -2.4   3.2      2.5 - 3.8  LVPWd(MM)       1.2 cm     2.4    0.88     0.64 - 1.11  LV mass(C)d(MM) 199.2 grams1.4    151.0    102.5 - 222.3  FS(MM)          47.0 %     3.0    35.1     29.0 - 42.5       Exercise stress test 7/26/18:  Good effort. Normal heart rate response to exercise. Normal baseline blood pressure with exaggerated blood pressure response to exercise. Baseline ECG with T wave inversions in the inferior limb leads and lateral precordial leads and LVH by voltage criteria. Single PVC at HR 193bpm. T wave inversions persisted throughout study. No symptoms.    In summary, Alexys is a 17  year old 1  month old with abnormal ecg and increased LV mass by echo, that has continued to increase in size. Despite a holter without ectopy and an exercise stress test without abnormal ST elevation nor decreases in  blood pressure with exercise, there is concern for hypertrophic cardiomyopathy given absolute measurements of the interventricular septum and posterior LV wall on echocardiogram with no increase in the size of the LV chamber size that would more likely represent an athlete's heart.  We discussed these thoughts with family and the plan is as follows:  -Start metoprolol XR 12.5mg daily. If have any symptoms after starting medication please call for dose adjustment: lightheaded/dizzy, low heart rate, mood changes, decreased energy.  -Concerning symptoms to stop exercise and call for early followup: chest pain (particulary with exercise), racing heart beat, shortness of breath with exercise, decreased exercise tolerance  -Ok to continue with sports for now, but if any chest pain or any progression with ventricular hypertrophy on next echo may exercise restrict  -Return to clinic with echo, ecg, holter in 3 months    Thank you for the opportunity to participate in Alexys's care.  We did not recommend any activity restrictions or endocarditis prophylaxis.  Please do not hesitate to call with questions or concerns.      Diagnoses:   1. Hypertrophic cardiomyopathy    Most sincerely,    Donna Marquez MD   Pediatric Cardiology    CC  JAMES CHOU    Copy to patient    Parent(s) of Alexys Givens  [address]  [facility] [address]

## 2024-02-02 ENCOUNTER — TELEPHONE (OUTPATIENT)
Dept: FAMILY MEDICINE | Facility: CLINIC | Age: 74
End: 2024-02-02
Payer: MEDICARE

## 2024-02-02 LAB — BACTERIA UR CULT: ABNORMAL

## 2024-02-02 RX ORDER — CEFDINIR 300 MG/1
300 CAPSULE ORAL 2 TIMES DAILY
Qty: 14 CAPSULE | Refills: 0 | Status: SHIPPED | OUTPATIENT
Start: 2024-02-02

## 2024-02-22 RX ORDER — SOLIFENACIN SUCCINATE 10 MG/1
10 TABLET, FILM COATED ORAL
Qty: 90 TABLET | Refills: 1 | Status: SHIPPED | OUTPATIENT
Start: 2024-02-22

## 2024-02-22 RX ORDER — BUPROPION HYDROCHLORIDE 300 MG/1
300 TABLET ORAL
Qty: 90 TABLET | Refills: 1 | Status: SHIPPED | OUTPATIENT
Start: 2024-02-22

## 2024-02-22 RX ORDER — HYDROGEN PEROXIDE 3 %
20 SOLUTION, NON-ORAL MISCELLANEOUS
Qty: 90 CAPSULE | Refills: 1 | Status: SHIPPED | OUTPATIENT
Start: 2024-02-22

## 2024-04-04 ENCOUNTER — TELEPHONE (OUTPATIENT)
Dept: FAMILY MEDICINE | Facility: CLINIC | Age: 74
End: 2024-04-04
Payer: MEDICARE

## 2024-04-04 NOTE — TELEPHONE ENCOUNTER
Pt left VM stating Victoza is on back order at pharmacy. She is wondering if you can order a substitution.

## 2024-04-05 RX ORDER — PEN NEEDLE, DIABETIC 32GX 5/32"
NEEDLE, DISPOSABLE MISCELLANEOUS
Qty: 100 EACH | Refills: 2 | Status: SHIPPED | OUTPATIENT
Start: 2024-04-05

## 2024-05-30 ENCOUNTER — HOSPITAL ENCOUNTER (OUTPATIENT)
Dept: RADIOLOGY | Facility: HOSPITAL | Age: 74
Discharge: HOME | End: 2024-05-30
Attending: FAMILY MEDICINE
Payer: MEDICARE

## 2024-05-30 DIAGNOSIS — Z12.31 SCREENING MAMMOGRAM FOR BREAST CANCER: ICD-10-CM

## 2024-05-30 DIAGNOSIS — N95.9 MENOPAUSAL PROBLEM: ICD-10-CM

## 2024-05-30 PROCEDURE — 77080 DXA BONE DENSITY AXIAL: CPT | Mod: TC

## 2024-05-30 PROCEDURE — 77063 BREAST TOMOSYNTHESIS BI: CPT | Mod: 26,,, | Performed by: RADIOLOGY

## 2024-05-30 PROCEDURE — 77063 BREAST TOMOSYNTHESIS BI: CPT | Mod: TC

## 2024-05-30 PROCEDURE — 77067 SCR MAMMO BI INCL CAD: CPT | Mod: 26,,, | Performed by: RADIOLOGY

## 2024-06-11 ENCOUNTER — TELEPHONE (OUTPATIENT)
Dept: FAMILY MEDICINE | Facility: CLINIC | Age: 74
End: 2024-06-11
Payer: MEDICARE

## 2024-06-18 ENCOUNTER — TELEPHONE (OUTPATIENT)
Dept: FAMILY MEDICINE | Facility: CLINIC | Age: 74
End: 2024-06-18
Payer: MEDICARE

## 2024-06-18 NOTE — TELEPHONE ENCOUNTER
Pt left message would like to reschedule appt.   Stated she has visitors from out of town and has not been watching her diet as well as she should to obtain correct labs. She also said her MMG is scheduled in July and would find that after that time would be more appropriate to see you to review results.

## 2024-07-11 ENCOUNTER — HOSPITAL ENCOUNTER (OUTPATIENT)
Dept: RADIOLOGY | Facility: HOSPITAL | Age: 74
Discharge: HOME | End: 2024-07-11
Attending: FAMILY MEDICINE
Payer: MEDICARE

## 2024-07-11 DIAGNOSIS — R92.8 ABNORMAL MAMMOGRAM: ICD-10-CM

## 2024-07-11 PROCEDURE — 77066 DX MAMMO INCL CAD BI: CPT | Mod: TC

## 2024-07-11 PROCEDURE — 77062 BREAST TOMOSYNTHESIS BI: CPT | Mod: 26,,, | Performed by: RADIOLOGY

## 2024-07-11 PROCEDURE — 76642 ULTRASOUND BREAST LIMITED: CPT | Mod: TC,50

## 2024-07-11 PROCEDURE — 77066 DX MAMMO INCL CAD BI: CPT | Mod: 26,,, | Performed by: RADIOLOGY

## 2024-07-11 PROCEDURE — 76642 ULTRASOUND BREAST LIMITED: CPT | Mod: 26,50,, | Performed by: RADIOLOGY

## 2024-07-11 PROCEDURE — 77062 BREAST TOMOSYNTHESIS BI: CPT | Mod: TC

## 2024-07-23 ENCOUNTER — OFFICE VISIT (OUTPATIENT)
Dept: FAMILY MEDICINE | Facility: CLINIC | Age: 74
End: 2024-07-23
Payer: MEDICARE

## 2024-07-23 VITALS
WEIGHT: 217.63 LBS | TEMPERATURE: 98 F | RESPIRATION RATE: 18 BRPM | OXYGEN SATURATION: 98 % | DIASTOLIC BLOOD PRESSURE: 76 MMHG | SYSTOLIC BLOOD PRESSURE: 138 MMHG | HEIGHT: 63 IN | BODY MASS INDEX: 38.56 KG/M2 | HEART RATE: 74 BPM

## 2024-07-23 DIAGNOSIS — E78.5 HYPERLIPIDEMIA, UNSPECIFIED HYPERLIPIDEMIA TYPE: ICD-10-CM

## 2024-07-23 DIAGNOSIS — I10 HYPERTENSION, UNSPECIFIED TYPE: ICD-10-CM

## 2024-07-23 DIAGNOSIS — K21.9 GASTROESOPHAGEAL REFLUX DISEASE, UNSPECIFIED WHETHER ESOPHAGITIS PRESENT: ICD-10-CM

## 2024-07-23 DIAGNOSIS — E11.9 TYPE 2 DIABETES MELLITUS WITHOUT COMPLICATION, WITHOUT LONG-TERM CURRENT USE OF INSULIN: Primary | ICD-10-CM

## 2024-07-23 DIAGNOSIS — M79.10 MYALGIA: ICD-10-CM

## 2024-07-23 DIAGNOSIS — Z23 IMMUNIZATION DUE: ICD-10-CM

## 2024-07-23 DIAGNOSIS — M79.18 MYALGIA, OTHER SITE: ICD-10-CM

## 2024-07-23 LAB
25(OH)D3+25(OH)D2 SERPL-MCNC: 41 NG/ML (ref 30–80)
ALBUMIN SERPL-MCNC: 4 G/DL (ref 3.4–4.8)
ALBUMIN/GLOB SERPL: 1.2 RATIO (ref 1.1–2)
ALP SERPL-CCNC: 122 UNIT/L (ref 40–150)
ALT SERPL-CCNC: 20 UNIT/L (ref 0–55)
ANION GAP SERPL CALC-SCNC: 8 MEQ/L
AST SERPL-CCNC: 17 UNIT/L (ref 5–34)
BASOPHILS # BLD AUTO: 0.04 X10(3)/MCL
BASOPHILS NFR BLD AUTO: 0.5 %
BILIRUB SERPL-MCNC: 0.6 MG/DL
BUN SERPL-MCNC: 10.5 MG/DL (ref 9.8–20.1)
CALCIUM SERPL-MCNC: 10 MG/DL (ref 8.4–10.2)
CHLORIDE SERPL-SCNC: 110 MMOL/L (ref 98–107)
CO2 SERPL-SCNC: 25 MMOL/L (ref 23–31)
CREAT SERPL-MCNC: 0.88 MG/DL (ref 0.55–1.02)
CREAT/UREA NIT SERPL: 12
EOSINOPHIL # BLD AUTO: 0.18 X10(3)/MCL (ref 0–0.9)
EOSINOPHIL NFR BLD AUTO: 2.4 %
ERYTHROCYTE [DISTWIDTH] IN BLOOD BY AUTOMATED COUNT: 14 % (ref 11.5–17)
ERYTHROCYTE [SEDIMENTATION RATE] IN BLOOD: 10 MM/HR (ref 0–20)
EST. AVERAGE GLUCOSE BLD GHB EST-MCNC: 116.9 MG/DL
GFR SERPLBLD CREATININE-BSD FMLA CKD-EPI: >60 ML/MIN/1.73/M2
GLOBULIN SER-MCNC: 3.4 GM/DL (ref 2.4–3.5)
GLUCOSE SERPL-MCNC: 131 MG/DL (ref 82–115)
HBA1C MFR BLD: 5.7 %
HCT VFR BLD AUTO: 43.9 % (ref 37–47)
HGB BLD-MCNC: 14.5 G/DL (ref 12–16)
IMM GRANULOCYTES # BLD AUTO: 0.03 X10(3)/MCL (ref 0–0.04)
IMM GRANULOCYTES NFR BLD AUTO: 0.4 %
LYMPHOCYTES # BLD AUTO: 1.54 X10(3)/MCL (ref 0.6–4.6)
LYMPHOCYTES NFR BLD AUTO: 20.3 %
MCH RBC QN AUTO: 29.1 PG (ref 27–31)
MCHC RBC AUTO-ENTMCNC: 33 G/DL (ref 33–36)
MCV RBC AUTO: 88.2 FL (ref 80–94)
MONOCYTES # BLD AUTO: 0.47 X10(3)/MCL (ref 0.1–1.3)
MONOCYTES NFR BLD AUTO: 6.2 %
NEUTROPHILS # BLD AUTO: 5.31 X10(3)/MCL (ref 2.1–9.2)
NEUTROPHILS NFR BLD AUTO: 70.2 %
NRBC BLD AUTO-RTO: 0 %
PLATELET # BLD AUTO: 211 X10(3)/MCL (ref 130–400)
PMV BLD AUTO: 11.1 FL (ref 7.4–10.4)
POTASSIUM SERPL-SCNC: 3.9 MMOL/L (ref 3.5–5.1)
PROT SERPL-MCNC: 7.4 GM/DL (ref 5.8–7.6)
RBC # BLD AUTO: 4.98 X10(6)/MCL (ref 4.2–5.4)
RHEUMATOID FACT SERPL-ACNC: <13 IU/ML
SODIUM SERPL-SCNC: 143 MMOL/L (ref 136–145)
TSH SERPL-ACNC: 2.03 UIU/ML (ref 0.35–4.94)
WBC # BLD AUTO: 7.57 X10(3)/MCL (ref 4.5–11.5)

## 2024-07-23 PROCEDURE — 85652 RBC SED RATE AUTOMATED: CPT | Performed by: FAMILY MEDICINE

## 2024-07-23 PROCEDURE — 84443 ASSAY THYROID STIM HORMONE: CPT | Performed by: FAMILY MEDICINE

## 2024-07-23 PROCEDURE — 86039 ANTINUCLEAR ANTIBODIES (ANA): CPT | Performed by: FAMILY MEDICINE

## 2024-07-23 PROCEDURE — 82306 VITAMIN D 25 HYDROXY: CPT | Performed by: FAMILY MEDICINE

## 2024-07-23 PROCEDURE — G0009 ADMIN PNEUMOCOCCAL VACCINE: HCPCS | Mod: PBBFAC

## 2024-07-23 PROCEDURE — 90677 PCV20 VACCINE IM: CPT | Mod: PBBFAC

## 2024-07-23 PROCEDURE — 36415 COLL VENOUS BLD VENIPUNCTURE: CPT

## 2024-07-23 PROCEDURE — 86431 RHEUMATOID FACTOR QUANT: CPT | Mod: 59 | Performed by: FAMILY MEDICINE

## 2024-07-23 PROCEDURE — 99214 OFFICE O/P EST MOD 30 MIN: CPT | Mod: PBBFAC,25 | Performed by: FAMILY MEDICINE

## 2024-07-23 PROCEDURE — 86431 RHEUMATOID FACTOR QUANT: CPT | Performed by: FAMILY MEDICINE

## 2024-07-23 PROCEDURE — 80053 COMPREHEN METABOLIC PANEL: CPT | Performed by: FAMILY MEDICINE

## 2024-07-23 PROCEDURE — 85025 COMPLETE CBC W/AUTO DIFF WBC: CPT

## 2024-07-23 PROCEDURE — 83036 HEMOGLOBIN GLYCOSYLATED A1C: CPT

## 2024-07-23 RX ORDER — TOPIRAMATE 25 MG/1
25 TABLET ORAL 2 TIMES DAILY
Qty: 180 TABLET | Refills: 1 | Status: SHIPPED | OUTPATIENT
Start: 2024-07-23

## 2024-07-23 RX ORDER — METFORMIN HYDROCHLORIDE 500 MG/1
500 TABLET, EXTENDED RELEASE ORAL DAILY
Qty: 180 TABLET | Refills: 1 | Status: SHIPPED | OUTPATIENT
Start: 2024-07-23

## 2024-07-23 RX ORDER — FUROSEMIDE 20 MG/1
20 TABLET ORAL DAILY
Qty: 30 TABLET | Refills: 1 | Status: SHIPPED | OUTPATIENT
Start: 2024-07-23

## 2024-07-23 RX ORDER — HYDROGEN PEROXIDE 3 %
20 SOLUTION, NON-ORAL MISCELLANEOUS DAILY
Qty: 90 CAPSULE | Refills: 1 | Status: SHIPPED | OUTPATIENT
Start: 2024-07-23

## 2024-07-23 RX ORDER — SOLIFENACIN SUCCINATE 10 MG/1
10 TABLET, FILM COATED ORAL DAILY
Qty: 90 TABLET | Refills: 1 | Status: SHIPPED | OUTPATIENT
Start: 2024-07-23

## 2024-07-23 RX ORDER — PRAVASTATIN SODIUM 20 MG/1
20 TABLET ORAL DAILY
Qty: 90 TABLET | Refills: 1 | Status: SHIPPED | OUTPATIENT
Start: 2024-07-23 | End: 2025-07-23

## 2024-07-23 RX ORDER — BUPROPION HYDROCHLORIDE 300 MG/1
300 TABLET ORAL DAILY
Qty: 90 TABLET | Refills: 1 | Status: SHIPPED | OUTPATIENT
Start: 2024-07-23

## 2024-07-23 RX ADMIN — PNEUMOCOCCAL 20-VALENT CONJUGATE VACCINE 0.5 ML
2.2; 2.2; 2.2; 2.2; 2.2; 2.2; 2.2; 2.2; 2.2; 2.2; 2.2; 2.2; 2.2; 2.2; 2.2; 2.2; 4.4; 2.2; 2.2; 2.2 INJECTION, SUSPENSION INTRAMUSCULAR at 11:07

## 2024-07-23 NOTE — PROGRESS NOTES
"Subjective     Patient ID: Chiquis Horner is a 73 y.o. female.    Chief Complaint: Follow-up, Diabetes, and Hypertension    HPI    07/23/2024        DM - cbg 150-200 for last several months has been out of Victoza due to pharmacy availability, and stress eating  due to loss of mother and long time pet -her dog    HLD -only using Pravachol 2-3 d/week, had even done a holiday and no improvement in general aches    Hypothyroid - uses meds 1-2d/week due to forgetting    GERD - well controlled with low dose PPI, failed weaning, no melena, ABD pain    C/o  Gen achiness/myalgias mostly, ongoing for couple on months, requires Motrin daily = sig relief ,cannot describe if it is more bone, muscle versus joint or if it is proximal or activity related, just hurts all over and it runs "deep", no  new meds, activity, trauma, prior STEIN, admits has not been using thyroid meds daily, 1-2/week for few months, levels here were always OK, in [place] s he was told it was underactive and stared on [place] thyroid        [facility] Maintenance  Mammogram - 5/2024  DEXA--  5/2024  PAP-ISAIAH/BSO, no history of abnormal  Colonoscopy-2/2014, Dr. Leyla Smith, rectal biopsy =ulcer, declines repeat, understands risks     Care team  [facility]- 6/2021 L hip  - inj,prn  Ophth - eye clinic, 9/2024    Problem list:  DM  HTN  HLD  OA right hand,CMC  GERD, Chronic  Depression  Urge Incontinence  Benign Tremor  Chronic left hip greater trochanteric bursitis, gluteus minimus tear, s/p bursa injection #1, 7/2021      Current Outpatient Medications   Medication Instructions    acyclovir 5% (ZOVIRAX) 5 % Crea Apply to cold sore q 3 hours prn cold sore    buPROPion (WELLBUTRIN XL) 300 mg, Oral    busPIRone (BUSPAR) 5 mg, Oral, 2 times daily PRN    esomeprazole (NEXIUM) 20 mg, Oral    furosemide (LASIX) 20 mg, Oral, Daily                                                         - 2/week    hyoscyamine 0.125 mg, Sublingual, Every 4 hours PRN    metFORMIN " "(GLUCOPHAGE XR) 500 mg, Oral, Daily                              -using QOD    pen needle, diabetic (TECHLITE PEN NEEDLE) 32 gauge x 5/32" Ndle USE 1 NEEDLE DAILY    pravastatin (PRAVACHOL) 20 mg, Oral, Daily, For cholesterol                             using 2-3d/week    solifenacin (VESICARE) 10 mg, Oral    thyroid (pork) (ARMOUR THYROID) 60 mg, Oral, Before breakfast                                     using 2/week max       topiramate (TOPAMAX) 25 mg, Oral, Nightly    VICTOZA 2-YUMIKO 1.2 mg, Subcutaneous, Daily                                    -off x 1 month         ROS  Respiratory: no cough, wheezing, SOB  Cardiovascular: no CP, palpitations  Gastrointestinal: no NVD, ABD pain, blood in stool, melena      PE  Vitals:    07/23/24 1012   BP: 138/76   BP Location: Right arm   Patient Position: Sitting   BP Method: Large (Manual)   Pulse: 74   Resp: 18   Temp: 98.1 °F (36.7 °C)   TempSrc: Oral   SpO2: 98%   Weight: 98.7 kg (217 lb 9.5 oz)   Height: 5' 3" (1.6 m)     General - NAD, comfortable  HEENT- nl TM,EAC, josiah w/o redness  Neck - no node, mass ,thyromegaly  Respiratory - CTA BL, no distress  Cardiovascular - RRR, no murmur  Gastrointestinal - soft NT, no mass, nl BS x 4     Protective Sensation (w/ 10 gram monofilament):  Right: Intact  Left: Intact    Visual Inspection:  Dry skin, no callus    Pedal Pulses:   Right: Present  Left: Present    Posterior Tibialis Pulses:   Right:Present  Left: Present      No acute joint pathology  Reflexes 2+ symmetrical  Full painless ROM wrists, knees, hips  No C or L spine tend    Assessment  1. Type 2 diabetes mellitus without complication, without long-term current use of insulin    2. Hypertension, unspecified type    3. Hyperlipidemia, unspecified hyperlipidemia type        Plan  lab  PCV 20  Continue current meds, stressed daily adherence  Complete FIT  Discuss DM and thyroid management once lab results are in (will need refills)  Rec Tdap at local pharmacy  RTC 3 " mos      Orders Placed This Encounter    CBC Auto Differential    Comprehensive Metabolic Panel    Vitamin D    TSH    Sedimentation rate    Rheumatoid Factor IgA    Rheumatoid Factor IgM    Rheumatoid Quantitative    JEFERSON IgG by IFA    Hemoglobin A1C    CBC with Differential    buPROPion (WELLBUTRIN XL) 300 MG 24 hr tablet    esomeprazole (NEXIUM) 20 MG capsule    furosemide (LASIX) 20 MG tablet    metFORMIN (GLUCOPHAGE XR) 500 MG ER 24hr tablet    pravastatin (PRAVACHOL) 20 MG tablet    solifenacin (VESICARE) 10 MG tablet    topiramate (TOPAMAX) 25 MG tablet    pneumoc 20-raymon conj-dip cr(PF) (PREVNAR-20 (PF)) injection Syrg 0.5 mL

## 2024-07-24 ENCOUNTER — TELEPHONE (OUTPATIENT)
Dept: FAMILY MEDICINE | Facility: CLINIC | Age: 74
End: 2024-07-24
Payer: MEDICARE

## 2024-07-24 LAB
ANA PAT SER IF-IMP: ABNORMAL
ANA SER QL HEP2 SUBST: ABNORMAL
ANA TITR SER HEP2 SUBST: ABNORMAL {TITER}

## 2024-07-24 RX ORDER — THYROID 30 MG/1
30 TABLET ORAL
Qty: 90 TABLET | Refills: 1 | Status: SHIPPED | OUTPATIENT
Start: 2024-07-24

## 2024-07-24 NOTE — TELEPHONE ENCOUNTER
Lab results by me to pt DM well controlled  Will stay off GLP1s due to cost and let me know if wants to try at alternate pharmacy, called [facility] and it was still > 300 dollars  pt does not have part d plan    She will start [place] thyroid 30mg, TSH 2 but having classic hypothyroid s/s that all got worse when she went off the 60mg dose

## 2024-07-25 LAB
RHEUMATOID FACTOR IGA QUANTITATIVE (OLG): 4.4 IU/ML
RHEUMATOID FACTOR IGM QUANTITATIVE (OLG): <0.6 IU/ML

## 2024-08-09 ENCOUNTER — TELEPHONE (OUTPATIENT)
Dept: FAMILY MEDICINE | Facility: CLINIC | Age: 74
End: 2024-08-09
Payer: MEDICARE

## 2024-08-09 DIAGNOSIS — R76.8 POSITIVE ANA (ANTINUCLEAR ANTIBODY): Primary | ICD-10-CM

## 2024-10-17 LAB
LEFT EYE DM RETINOPATHY: POSITIVE
RIGHT EYE DM RETINOPATHY: POSITIVE
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
## 2024-11-14 ENCOUNTER — HOSPITAL ENCOUNTER (OUTPATIENT)
Dept: RADIOLOGY | Facility: HOSPITAL | Age: 74
Discharge: HOME | End: 2024-11-14
Attending: NURSE PRACTITIONER
Payer: MEDICARE

## 2024-11-14 ENCOUNTER — OFFICE VISIT (OUTPATIENT)
Dept: RHEUMATOLOGY | Facility: CLINIC | Age: 74
End: 2024-11-14
Payer: MEDICARE

## 2024-11-14 VITALS
SYSTOLIC BLOOD PRESSURE: 142 MMHG | HEART RATE: 76 BPM | WEIGHT: 220.63 LBS | HEIGHT: 63 IN | DIASTOLIC BLOOD PRESSURE: 68 MMHG | TEMPERATURE: 98 F | BODY MASS INDEX: 39.09 KG/M2 | OXYGEN SATURATION: 100 % | RESPIRATION RATE: 20 BRPM

## 2024-11-14 DIAGNOSIS — R76.8 POSITIVE ANA (ANTINUCLEAR ANTIBODY): ICD-10-CM

## 2024-11-14 DIAGNOSIS — G89.29 CHRONIC PAIN OF BOTH SHOULDERS: ICD-10-CM

## 2024-11-14 DIAGNOSIS — K21.9 GASTROESOPHAGEAL REFLUX DISEASE, UNSPECIFIED WHETHER ESOPHAGITIS PRESENT: ICD-10-CM

## 2024-11-14 DIAGNOSIS — M79.642 BILATERAL HAND PAIN: ICD-10-CM

## 2024-11-14 DIAGNOSIS — M79.641 BILATERAL HAND PAIN: ICD-10-CM

## 2024-11-14 DIAGNOSIS — M25.512 CHRONIC PAIN OF BOTH SHOULDERS: ICD-10-CM

## 2024-11-14 DIAGNOSIS — E66.01 SEVERE OBESITY (BMI 35.0-39.9) WITH COMORBIDITY: ICD-10-CM

## 2024-11-14 DIAGNOSIS — I10 PRIMARY HYPERTENSION: ICD-10-CM

## 2024-11-14 DIAGNOSIS — M25.511 CHRONIC PAIN OF BOTH SHOULDERS: ICD-10-CM

## 2024-11-14 DIAGNOSIS — R76.8 POSITIVE ANA (ANTINUCLEAR ANTIBODY): Primary | ICD-10-CM

## 2024-11-14 DIAGNOSIS — E11.9 TYPE 2 DIABETES MELLITUS WITHOUT COMPLICATION, WITHOUT LONG-TERM CURRENT USE OF INSULIN: ICD-10-CM

## 2024-11-14 PROCEDURE — 73130 X-RAY EXAM OF HAND: CPT | Mod: TC,RT

## 2024-11-14 PROCEDURE — 73030 X-RAY EXAM OF SHOULDER: CPT | Mod: TC,RT

## 2024-11-14 PROCEDURE — 73030 X-RAY EXAM OF SHOULDER: CPT | Mod: TC,LT

## 2024-11-14 PROCEDURE — 99205 OFFICE O/P NEW HI 60 MIN: CPT | Mod: S$PBB,,, | Performed by: NURSE PRACTITIONER

## 2024-11-14 PROCEDURE — 99215 OFFICE O/P EST HI 40 MIN: CPT | Mod: PBBFAC | Performed by: NURSE PRACTITIONER

## 2024-11-14 PROCEDURE — 73130 X-RAY EXAM OF HAND: CPT | Mod: TC,LT

## 2024-11-19 ENCOUNTER — PATIENT MESSAGE (OUTPATIENT)
Dept: RHEUMATOLOGY | Facility: CLINIC | Age: 74
End: 2024-11-19
Payer: MEDICARE

## 2024-11-19 ENCOUNTER — OFFICE VISIT (OUTPATIENT)
Dept: FAMILY MEDICINE | Facility: CLINIC | Age: 74
End: 2024-11-19
Payer: MEDICARE

## 2024-11-19 VITALS
BODY MASS INDEX: 38.59 KG/M2 | RESPIRATION RATE: 20 BRPM | SYSTOLIC BLOOD PRESSURE: 135 MMHG | DIASTOLIC BLOOD PRESSURE: 75 MMHG | WEIGHT: 217.81 LBS | HEART RATE: 71 BPM | HEIGHT: 63 IN | TEMPERATURE: 98 F | OXYGEN SATURATION: 97 %

## 2024-11-19 DIAGNOSIS — F32.A DEPRESSIVE DISORDER: ICD-10-CM

## 2024-11-19 DIAGNOSIS — K21.9 GASTROESOPHAGEAL REFLUX DISEASE, UNSPECIFIED WHETHER ESOPHAGITIS PRESENT: ICD-10-CM

## 2024-11-19 DIAGNOSIS — Z23 IMMUNIZATION DUE: ICD-10-CM

## 2024-11-19 DIAGNOSIS — E78.5 HYPERLIPIDEMIA, UNSPECIFIED HYPERLIPIDEMIA TYPE: ICD-10-CM

## 2024-11-19 DIAGNOSIS — R19.5 POSITIVE FIT (FECAL IMMUNOCHEMICAL TEST): ICD-10-CM

## 2024-11-19 DIAGNOSIS — E11.9 TYPE 2 DIABETES MELLITUS WITHOUT COMPLICATION, WITHOUT LONG-TERM CURRENT USE OF INSULIN: Primary | ICD-10-CM

## 2024-11-19 DIAGNOSIS — I10 HYPERTENSION, UNSPECIFIED TYPE: ICD-10-CM

## 2024-11-19 DIAGNOSIS — Z12.11 SCREEN FOR COLON CANCER: ICD-10-CM

## 2024-11-19 LAB — OCCULT BLD IA (OHS): POSITIVE

## 2024-11-19 PROCEDURE — 82274 ASSAY TEST FOR BLOOD FECAL: CPT | Performed by: FAMILY MEDICINE

## 2024-11-19 PROCEDURE — 99214 OFFICE O/P EST MOD 30 MIN: CPT | Mod: PBBFAC | Performed by: FAMILY MEDICINE

## 2024-11-19 RX ORDER — BUSPIRONE HYDROCHLORIDE 5 MG/1
5 TABLET ORAL 2 TIMES DAILY PRN
Qty: 180 TABLET | Refills: 1 | Status: SHIPPED | OUTPATIENT
Start: 2024-11-19

## 2024-11-19 NOTE — PROGRESS NOTES
"Subjective     Patient ID: Chiquis Horner is a 74 y.o. female.    Chief Complaint: Diabetes and Hypertension    HPI    11/19/2024  Overall doing great  Active lifestyle  No cog issues  fully independent  Saw RHEUM and STEIN so far (-) for autoimmune, few things still pending  No new c/o    DM -no recent home log, can tell when her sugar is high and thinks it has been OK, only uses metformin couple days a week due to SE    HTN - no meds, controlled, no dizziness, light headedness, HA    HLD - some statin intolerance but uses Pravachol 20 2d/week and doing ok    Depression/occ anxiety, doing well with daily Wellbutrin and Buspar prn, no  SI/HI, happy,  rests well, active    GERD - uses Nexium 20 prn and mianly when has to use Motrin for her arthralgias            Health Maintenance  Mammogram - 5/2024  DEXA--  5/2024  PAP-ISAIAH/BSO, no history of abnormal  Colonoscopy-2/2014, Dr. Leyla Smith, rectal biopsy =ulcer, declines repeat, understands risks  Imm -declined RSV/COVID     Care team  [facility] Ortho- 6/2021 L hip  - inj,prn  Ophth - eye clinic, 9/2024    Problem list:  DM  HTN  HLD  OA right hand,CMC  GERD, Chronic  Depression  Urge Incontinence  Benign Tremor  Chronic left hip greater trochanteric bursitis, gluteus minimus tear, s/p bursa injection #1, 7/2021         Current Outpatient Medications   Medication Instructions    acyclovir 5% (ZOVIRAX) 5 % Crea Apply to cold sore q 3 hours prn cold sore    buPROPion (WELLBUTRIN XL) 300 mg, Oral, Daily    busPIRone (BUSPAR) 5 mg, Oral, 2 times daily PRN    esomeprazole (NEXIUM) 20 mg, Oral, Daily    furosemide (LASIX) 20 mg, Oral, Daily    hyoscyamine 0.125 mg, Sublingual, Every 4 hours PRN    metFORMIN (GLUCOPHAGE XR) 500 mg, Oral, Daily    pen needle, diabetic (TECHLITE PEN NEEDLE) 32 gauge x 5/32" Ndle USE 1 NEEDLE DAILY    pravastatin (PRAVACHOL) 20 mg, Oral, Daily, For cholesterol    solifenacin (VESICARE) 10 mg, Oral, Daily    thyroid (pork) (ARMOUR THYROID) 30 " "mg, Oral, Before breakfast    topiramate (TOPAMAX) 25 mg, Oral, 2 times daily         ROS  ENT: no sore throat, ear pain,  nasal congestion/drainage  Respiratory: no cough, wheezing, SOB  Cardiovascular: no CP, palpitations, edema  Gastrointestinal: no NVD, ABD pain, blood in stool, melena  Genitourinary: no dysuria, frequency, urgency, hematuria      PE  Vitals:    11/19/24 0819   BP: 135/75   Pulse: 71   Resp: 20   Temp: 98.1 °F (36.7 °C)   TempSrc: Oral   SpO2: 97%   Weight: 98.8 kg (217 lb 12.8 oz)   Height: 5' 3" (1.6 m)     General - NAD, comfortable  HEENT- nl TM,EAC, josiah w/o redness  Neck - no adenopathy, mass ,thyromegaly  Respiratory - CTA BL, no distress  Cardiovascular - RRR, no murmur, trace pretibial pitting  Gastrointestinal - soft NT, no mass, nl BS x 4       Assessment  1. Type 2 diabetes mellitus without complication, without long-term current use of insulin    2. Hypertension, unspecified type    3. Hyperlipidemia, unspecified hyperlipidemia type    4. Depressive disorder    5. Gastroesophageal reflux disease, unspecified whether esophagitis present    6. Screen for colon cancer    7. Immunization due        Plan  Continue current meds with any changes listed below  Lipid, A1c- may d/c metformin due to SE  Flu shot  FIT KIT  Rtc 3 mos    Orders Placed This Encounter    Hemoglobin A1C    Lipid Panel    OCCULT BLOOD FECAL IMMUNOASSAY    busPIRone (BUSPAR) 5 MG Tab       "

## 2024-11-20 ENCOUNTER — TELEPHONE (OUTPATIENT)
Dept: FAMILY MEDICINE | Facility: CLINIC | Age: 74
End: 2024-11-20

## 2024-11-20 DIAGNOSIS — I10 HYPERTENSION, UNSPECIFIED TYPE: ICD-10-CM

## 2024-11-20 DIAGNOSIS — E11.9 TYPE 2 DIABETES MELLITUS WITHOUT COMPLICATION, WITHOUT LONG-TERM CURRENT USE OF INSULIN: Primary | ICD-10-CM

## 2024-11-20 DIAGNOSIS — R19.5 POSITIVE FIT (FECAL IMMUNOCHEMICAL TEST): ICD-10-CM

## 2024-11-20 DIAGNOSIS — Z23 IMMUNIZATION DUE: ICD-10-CM

## 2024-11-21 ENCOUNTER — CLINICAL SUPPORT (OUTPATIENT)
Dept: FAMILY MEDICINE | Facility: CLINIC | Age: 74
End: 2024-11-21
Payer: MEDICARE

## 2024-11-21 ENCOUNTER — LAB VISIT (OUTPATIENT)
Dept: FAMILY MEDICINE | Facility: CLINIC | Age: 74
End: 2024-11-21
Payer: MEDICARE

## 2024-11-21 DIAGNOSIS — I10 HYPERTENSION, UNSPECIFIED TYPE: ICD-10-CM

## 2024-11-21 DIAGNOSIS — Z23 FLU VACCINE NEED: Primary | ICD-10-CM

## 2024-11-21 DIAGNOSIS — E11.9 TYPE 2 DIABETES MELLITUS WITHOUT COMPLICATION, WITHOUT LONG-TERM CURRENT USE OF INSULIN: ICD-10-CM

## 2024-11-21 LAB
CHOLEST SERPL-MCNC: 243 MG/DL
CHOLEST/HDLC SERPL: 5 {RATIO} (ref 0–5)
EST. AVERAGE GLUCOSE BLD GHB EST-MCNC: 122.6 MG/DL
HBA1C MFR BLD: 5.9 %
HDLC SERPL-MCNC: 47 MG/DL (ref 35–60)
LDLC SERPL CALC-MCNC: 152 MG/DL (ref 50–140)
TRIGL SERPL-MCNC: 221 MG/DL (ref 37–140)
VLDLC SERPL CALC-MCNC: 44 MG/DL

## 2024-11-21 PROCEDURE — 83036 HEMOGLOBIN GLYCOSYLATED A1C: CPT

## 2024-11-21 PROCEDURE — 36415 COLL VENOUS BLD VENIPUNCTURE: CPT

## 2024-11-21 PROCEDURE — 80061 LIPID PANEL: CPT

## 2024-11-21 PROCEDURE — 90653 IIV ADJUVANT VACCINE IM: CPT | Mod: PBBFAC

## 2024-11-21 PROCEDURE — G0008 ADMIN INFLUENZA VIRUS VAC: HCPCS | Mod: PBBFAC

## 2024-11-21 RX ORDER — BUPROPION HYDROCHLORIDE 300 MG/1
300 TABLET ORAL DAILY
Qty: 90 TABLET | Refills: 1 | Status: SHIPPED | OUTPATIENT
Start: 2024-11-21

## 2024-11-21 RX ORDER — HYOSCYAMINE SULFATE 0.12 MG/1
0.12 TABLET SUBLINGUAL EVERY 4 HOURS PRN
Qty: 30 TABLET | Refills: 2 | Status: SHIPPED | OUTPATIENT
Start: 2024-11-21

## 2024-11-21 RX ORDER — HYDROGEN PEROXIDE 3 %
20 SOLUTION, NON-ORAL MISCELLANEOUS DAILY
Qty: 90 CAPSULE | Refills: 1 | Status: SHIPPED | OUTPATIENT
Start: 2024-11-21

## 2024-11-21 RX ORDER — SOLIFENACIN SUCCINATE 10 MG/1
10 TABLET, FILM COATED ORAL DAILY
Qty: 90 TABLET | Refills: 1 | Status: SHIPPED | OUTPATIENT
Start: 2024-11-21

## 2024-11-21 RX ADMIN — INFLUENZA A VIRUS A/VICTORIA/4897/2022 IVR-238 (H1N1) ANTIGEN (FORMALDEHYDE INACTIVATED), INFLUENZA A VIRUS A/THAILAND/8/2022 IVR-237 (H3N2) ANTIGEN (FORMALDEHYDE INACTIVATED), INFLUENZA B VIRUS B/AUSTRIA/1359417/2021 BVR-26 ANTIGEN (FORMALDEHYDE INACTIVATED) 0.5 ML: 15; 15; 15 INJECTION, SUSPENSION INTRAMUSCULAR at 09:11

## 2024-11-21 NOTE — PROGRESS NOTES
The patient presented to the clinic today for flu vaccine. Dr. Collins spoke with the patient about the vaccine and an order was placed. Verified patient's name,  and confirmed she wanted to proceed with vaccine. Injection given to left deltoid and documented properly. Patient tolerated injection well. No reactions after 15 minutes. Instructed patient to contact the office with questions or concern. Walked patient to checkout to schedule follow up appointment. MT

## 2024-11-26 PROBLEM — N39.41 URGE INCONTINENCE: Status: ACTIVE | Noted: 2024-11-26

## 2024-12-10 ENCOUNTER — PATIENT OUTREACH (OUTPATIENT)
Facility: CLINIC | Age: 74
End: 2024-12-10
Payer: MEDICARE

## 2024-12-10 NOTE — PROGRESS NOTES
Health Maintenance Topic(s) Outreach Outcomes & Actions Taken:    Eye Exam - Outreach Outcomes & Actions Taken  : Diabetic Eye External Records Uploaded, Care Team & History Updated if Applicable       Additional Notes:  DM eye exam 10/17/24

## 2024-12-16 ENCOUNTER — TELEPHONE (OUTPATIENT)
Dept: RHEUMATOLOGY | Facility: CLINIC | Age: 74
End: 2024-12-16
Payer: MEDICARE

## 2024-12-16 NOTE — TELEPHONE ENCOUNTER
Communicated labs due for Rheumatology Clinic.  Lab orders have already been placed in the computer. There is no fasting needed. [facility] Hours of operation M-F 0600-4pm.  You can go to any [facility] Facility and they will be able to see the orders in the computer. Please have labs completed at your earliest convenience.       If you would like for Rheumatology Clinic to fax your lab orders to another facility please call Rheumatology Clinic at [phone] or send a message through the portal with the Facility of choice name, telephone number, and fax number.      Feel free to call with any questions or concerns.    Patient plans to complete at [facility] in  and was thankful for tc.      Thanks!

## 2024-12-20 ENCOUNTER — TELEPHONE (OUTPATIENT)
Dept: FAMILY MEDICINE | Facility: CLINIC | Age: 74
End: 2024-12-20
Payer: MEDICARE

## 2024-12-20 NOTE — TELEPHONE ENCOUNTER
LDL went up to 160s when she reduced her Pravachol to 2d/week (Crestor intolerant, did not want lipitor), she has already restarted at once daily, has cscope 7/2025 ( on wait list)

## 2025-05-20 ENCOUNTER — OFFICE VISIT (OUTPATIENT)
Dept: FAMILY MEDICINE | Facility: CLINIC | Age: 75
End: 2025-05-20
Payer: MEDICARE

## 2025-05-20 VITALS
OXYGEN SATURATION: 97 % | WEIGHT: 220.81 LBS | BODY MASS INDEX: 39.12 KG/M2 | TEMPERATURE: 98 F | DIASTOLIC BLOOD PRESSURE: 74 MMHG | HEART RATE: 68 BPM | HEIGHT: 63 IN | SYSTOLIC BLOOD PRESSURE: 136 MMHG

## 2025-05-20 DIAGNOSIS — I10 HYPERTENSION, UNSPECIFIED TYPE: ICD-10-CM

## 2025-05-20 DIAGNOSIS — E78.5 HYPERLIPIDEMIA, UNSPECIFIED HYPERLIPIDEMIA TYPE: ICD-10-CM

## 2025-05-20 DIAGNOSIS — K29.70 GASTRITIS, PRESENCE OF BLEEDING UNSPECIFIED, UNSPECIFIED CHRONICITY, UNSPECIFIED GASTRITIS TYPE: Primary | ICD-10-CM

## 2025-05-20 DIAGNOSIS — R10.32 LLQ ABDOMINAL PAIN: ICD-10-CM

## 2025-05-20 DIAGNOSIS — E11.9 TYPE 2 DIABETES MELLITUS WITHOUT COMPLICATION, WITHOUT LONG-TERM CURRENT USE OF INSULIN: ICD-10-CM

## 2025-05-20 LAB
ALBUMIN SERPL-MCNC: 4 G/DL (ref 3.4–4.8)
ALBUMIN/GLOB SERPL: 1 RATIO (ref 1.1–2)
ALP SERPL-CCNC: 120 UNIT/L (ref 40–150)
ALT SERPL-CCNC: 23 UNIT/L (ref 0–55)
ANION GAP SERPL CALC-SCNC: 8 MEQ/L
AST SERPL-CCNC: 23 UNIT/L (ref 11–45)
BASOPHILS # BLD AUTO: 0.03 X10(3)/MCL
BASOPHILS NFR BLD AUTO: 0.4 %
BILIRUB SERPL-MCNC: 0.8 MG/DL
BUN SERPL-MCNC: 9.3 MG/DL (ref 9.8–20.1)
CALCIUM SERPL-MCNC: 9.7 MG/DL (ref 8.4–10.2)
CHLORIDE SERPL-SCNC: 111 MMOL/L (ref 98–107)
CHOLEST SERPL-MCNC: 233 MG/DL
CHOLEST/HDLC SERPL: 5 {RATIO} (ref 0–5)
CO2 SERPL-SCNC: 25 MMOL/L (ref 23–31)
CREAT SERPL-MCNC: 0.74 MG/DL (ref 0.55–1.02)
CREAT/UREA NIT SERPL: 13
EOSINOPHIL # BLD AUTO: 0.2 X10(3)/MCL (ref 0–0.9)
EOSINOPHIL NFR BLD AUTO: 2.7 %
ERYTHROCYTE [DISTWIDTH] IN BLOOD BY AUTOMATED COUNT: 13.6 % (ref 11.5–17)
EST. AVERAGE GLUCOSE BLD GHB EST-MCNC: 128.4 MG/DL
GFR SERPLBLD CREATININE-BSD FMLA CKD-EPI: >60 ML/MIN/1.73/M2
GLOBULIN SER-MCNC: 4 GM/DL (ref 2.4–3.5)
GLUCOSE SERPL-MCNC: 112 MG/DL (ref 82–115)
HBA1C MFR BLD: 6.1 %
HCT VFR BLD AUTO: 41.4 % (ref 37–47)
HDLC SERPL-MCNC: 46 MG/DL (ref 35–60)
HGB BLD-MCNC: 14.1 G/DL (ref 12–16)
IMM GRANULOCYTES # BLD AUTO: 0.01 X10(3)/MCL (ref 0–0.04)
IMM GRANULOCYTES NFR BLD AUTO: 0.1 %
LDLC SERPL CALC-MCNC: 156 MG/DL (ref 50–140)
LYMPHOCYTES # BLD AUTO: 1.65 X10(3)/MCL (ref 0.6–4.6)
LYMPHOCYTES NFR BLD AUTO: 22.2 %
MCH RBC QN AUTO: 30.1 PG (ref 27–31)
MCHC RBC AUTO-ENTMCNC: 34.1 G/DL (ref 33–36)
MCV RBC AUTO: 88.3 FL (ref 80–94)
MONOCYTES # BLD AUTO: 0.57 X10(3)/MCL (ref 0.1–1.3)
MONOCYTES NFR BLD AUTO: 7.7 %
NEUTROPHILS # BLD AUTO: 4.97 X10(3)/MCL (ref 2.1–9.2)
NEUTROPHILS NFR BLD AUTO: 66.9 %
NRBC BLD AUTO-RTO: 0 %
PLATELET # BLD AUTO: 232 X10(3)/MCL (ref 130–400)
PMV BLD AUTO: 10.5 FL (ref 7.4–10.4)
POTASSIUM SERPL-SCNC: 3.4 MMOL/L (ref 3.5–5.1)
PROT SERPL-MCNC: 8 GM/DL (ref 5.8–7.6)
RBC # BLD AUTO: 4.69 X10(6)/MCL (ref 4.2–5.4)
SODIUM SERPL-SCNC: 144 MMOL/L (ref 136–145)
TRIGL SERPL-MCNC: 153 MG/DL (ref 37–140)
TSH SERPL-ACNC: 0.77 UIU/ML (ref 0.35–4.94)
VLDLC SERPL CALC-MCNC: 31 MG/DL
WBC # BLD AUTO: 7.43 X10(3)/MCL (ref 4.5–11.5)

## 2025-05-20 PROCEDURE — 80053 COMPREHEN METABOLIC PANEL: CPT | Performed by: FAMILY MEDICINE

## 2025-05-20 PROCEDURE — 80061 LIPID PANEL: CPT | Performed by: FAMILY MEDICINE

## 2025-05-20 PROCEDURE — 85025 COMPLETE CBC W/AUTO DIFF WBC: CPT | Performed by: FAMILY MEDICINE

## 2025-05-20 PROCEDURE — 84443 ASSAY THYROID STIM HORMONE: CPT | Performed by: FAMILY MEDICINE

## 2025-05-20 PROCEDURE — 99214 OFFICE O/P EST MOD 30 MIN: CPT | Mod: PBBFAC | Performed by: FAMILY MEDICINE

## 2025-05-20 PROCEDURE — 83036 HEMOGLOBIN GLYCOSYLATED A1C: CPT | Performed by: FAMILY MEDICINE

## 2025-05-20 RX ORDER — HYOSCYAMINE SULFATE 0.12 MG/1
0.12 TABLET SUBLINGUAL EVERY 4 HOURS PRN
Qty: 30 TABLET | Refills: 2 | Status: SHIPPED | OUTPATIENT
Start: 2025-05-20

## 2025-05-20 RX ORDER — AMOXICILLIN AND CLAVULANATE POTASSIUM 875; 125 MG/1; MG/1
1 TABLET, FILM COATED ORAL 2 TIMES DAILY
Qty: 28 TABLET | Refills: 0 | Status: SHIPPED | OUTPATIENT
Start: 2025-05-20

## 2025-05-20 NOTE — PROGRESS NOTES
"Subjective     Patient ID: Chiquis Horner is a 74 y.o. female.    Chief Complaint: Follow-up (Gastro Issues)    HPI    05/20/2025    Using extra Motrin last few months for left hip and right shoulder pain  LUQ /epigastric burning and discomfort with LLQ abd pain x  2 weeks with assoc HB/belching, sour taste  5 days ago - "diarrhea"  dark black mushy stool anytime she eats, no BM if skips meal, 2-3 episodes "spit up" during this time more related to the acid regurg  No FC, sick contacts, food poisoning  Feels little better since increased her Nexium 20mg/ day to 3caps/day, added Levsin and "diarrhea" stopped with kaopectate  No recent CT A/P  Has cscope anna 7/2025        Current Outpatient Medications   Medication Instructions    acyclovir 5% (ZOVIRAX) 5 % Crea Apply to cold sore q 3 hours prn cold sore    amoxicillin-clavulanate 875-125mg (AUGMENTIN) 875-125 mg per tablet 1 tablet, Oral, 2 times daily    buPROPion (WELLBUTRIN XL) 300 mg, Oral, Daily    busPIRone (BUSPAR) 5 mg, Oral, 2 times daily PRN    esomeprazole (NEXIUM) 20 mg, Oral, Daily    furosemide (LASIX) 20 mg, Oral, Daily    hyoscyamine 0.125 mg, Sublingual, Every 4 hours PRN    metFORMIN (GLUCOPHAGE XR) 500 mg, Oral, Daily    pen needle, diabetic (TECHLITE PEN NEEDLE) 32 gauge x 5/32" Ndle USE 1 NEEDLE DAILY    pravastatin (PRAVACHOL) 20 mg, Oral, Daily, For cholesterol    solifenacin (VESICARE) 10 mg, Oral, Daily    thyroid (pork) (ARMOUR THYROID) 30 mg, Oral, Before breakfast    topiramate (TOPAMAX) 25 mg, Oral, 2 times daily         ROS  Respiratory: no cough, wheezing, SOB  Cardiovascular: no CP, palpitations  Gastrointestinal: no NVD, ABD pain, blood in stool, melena  Yannick/Lymph: no abnormal bruising or bleeding      PE  Vitals:    05/20/25 1428   BP: 136/74   BP Location: Right arm   Patient Position: Sitting   Pulse: 68   Temp: 98.3 °F (36.8 °C)   TempSrc: Oral   SpO2: 97%   Weight: 100.2 kg (220 lb 12.8 oz)   Height: 5' 3" (1.6 m) "     General - NAD, comfortable  HEENT- nl TM,EAC, josiah w/o redness, moist  Neck - no adenopathy, mass ,thyromegaly  Respiratory - CTA BL, no distress  Cardiovascular - RRR, no murmur  Gastrointestinal - soft, tender LLQ w/o duy/gd,  no mass, nl BS x 4     Assessment  1. Gastritis, presence of bleeding unspecified, unspecified chronicity, unspecified gastritis type    2. LLQ abdominal pain , suspect diverticulitis     Plan  Continue current meds with any changes listed below  Augmentin 875  Increase Nexium 20mg to 2 q AM, may use 2 PM if  needed  Christian diet, increase fiber  LAB  Call if any bleeding, pain,fever  Further STEIN/imaging if any change or concern  [facility]    Orders Placed This Encounter    Comprehensive Metabolic Panel    Lipid Panel    Hemoglobin A1C    TSH    CBC Auto Differential    CBC with Differential    amoxicillin-clavulanate 875-125mg (AUGMENTIN) 875-125 mg per tablet    hyoscyamine 0.125 mg Subl

## 2025-05-27 DIAGNOSIS — Z12.11 COLON CANCER SCREENING: Primary | ICD-10-CM

## 2025-05-27 RX ORDER — POLYETHYLENE GLYCOL 3350, SODIUM SULFATE, SODIUM CHLORIDE, POTASSIUM CHLORIDE, SODIUM ASCORBATE, AND ASCORBIC ACID 7.5-2.691G
KIT ORAL
Qty: 1 KIT | Refills: 0 | Status: SHIPPED | OUTPATIENT
Start: 2025-05-27

## 2025-05-28 ENCOUNTER — TELEPHONE (OUTPATIENT)
Dept: FAMILY MEDICINE | Facility: CLINIC | Age: 75
End: 2025-05-28
Payer: MEDICARE

## 2025-05-28 RX ORDER — FLUCONAZOLE 150 MG/1
TABLET ORAL
Qty: 2 TABLET | Refills: 0 | Status: SHIPPED | OUTPATIENT
Start: 2025-05-28

## 2025-05-28 NOTE — TELEPHONE ENCOUNTER
Spoke with pt about results, Augmentin helps but gave bad yeast infection,  off all meds, sending Diflucan

## 2025-06-03 ENCOUNTER — OFFICE VISIT (OUTPATIENT)
Dept: FAMILY MEDICINE | Facility: CLINIC | Age: 75
End: 2025-06-03
Payer: MEDICARE

## 2025-06-03 VITALS
OXYGEN SATURATION: 100 % | WEIGHT: 217.81 LBS | TEMPERATURE: 98 F | HEART RATE: 84 BPM | DIASTOLIC BLOOD PRESSURE: 82 MMHG | BODY MASS INDEX: 38.59 KG/M2 | SYSTOLIC BLOOD PRESSURE: 132 MMHG | RESPIRATION RATE: 18 BRPM | HEIGHT: 63 IN

## 2025-06-03 DIAGNOSIS — N39.41 URGE INCONTINENCE: ICD-10-CM

## 2025-06-03 DIAGNOSIS — E78.5 HYPERLIPIDEMIA, UNSPECIFIED HYPERLIPIDEMIA TYPE: ICD-10-CM

## 2025-06-03 DIAGNOSIS — E11.9 TYPE 2 DIABETES MELLITUS WITHOUT COMPLICATION, WITHOUT LONG-TERM CURRENT USE OF INSULIN: Primary | ICD-10-CM

## 2025-06-03 DIAGNOSIS — K21.9 GASTROESOPHAGEAL REFLUX DISEASE, UNSPECIFIED WHETHER ESOPHAGITIS PRESENT: ICD-10-CM

## 2025-06-03 DIAGNOSIS — I10 HYPERTENSION, UNSPECIFIED TYPE: ICD-10-CM

## 2025-06-03 DIAGNOSIS — F32.A DEPRESSION, UNSPECIFIED DEPRESSION TYPE: ICD-10-CM

## 2025-06-03 DIAGNOSIS — Z12.31 SCREENING MAMMOGRAM FOR BREAST CANCER: ICD-10-CM

## 2025-06-03 DIAGNOSIS — E11.3299 MILD NONPROLIFERATIVE DIABETIC RETINOPATHY ASSOCIATED WITH TYPE 2 DIABETES MELLITUS, MACULAR EDEMA PRESENCE UNSPECIFIED, UNSPECIFIED LATERALITY: ICD-10-CM

## 2025-06-03 DIAGNOSIS — R10.13 EPIGASTRIC PAIN: ICD-10-CM

## 2025-06-03 LAB
CREAT UR-MCNC: 195.7 MG/DL (ref 45–106)
MICROALBUMIN UR-MCNC: 12.6 UG/ML
MICROALBUMIN/CREAT RATIO PNL UR: 6.4 MG/GM CR (ref 0–30)

## 2025-06-03 PROCEDURE — 82043 UR ALBUMIN QUANTITATIVE: CPT | Performed by: FAMILY MEDICINE

## 2025-06-03 PROCEDURE — 99215 OFFICE O/P EST HI 40 MIN: CPT | Mod: PBBFAC | Performed by: FAMILY MEDICINE

## 2025-06-03 RX ORDER — LIRAGLUTIDE 6 MG/ML
0.6 INJECTION SUBCUTANEOUS DAILY
Qty: 3 ML | Refills: 5 | Status: SHIPPED | OUTPATIENT
Start: 2025-06-03 | End: 2025-06-03

## 2025-06-03 RX ORDER — METFORMIN HYDROCHLORIDE 500 MG/1
500 TABLET, EXTENDED RELEASE ORAL DAILY
Qty: 180 TABLET | Refills: 1 | Status: SHIPPED | OUTPATIENT
Start: 2025-06-03

## 2025-06-03 RX ORDER — TOPIRAMATE 25 MG/1
25 TABLET, FILM COATED ORAL 2 TIMES DAILY
Qty: 180 TABLET | Refills: 1 | Status: SHIPPED | OUTPATIENT
Start: 2025-06-03

## 2025-06-03 RX ORDER — FLUCONAZOLE 150 MG/1
TABLET ORAL
Qty: 2 TABLET | Refills: 0 | Status: SHIPPED | OUTPATIENT
Start: 2025-06-03

## 2025-06-03 RX ORDER — BUSPIRONE HYDROCHLORIDE 5 MG/1
5 TABLET ORAL 2 TIMES DAILY PRN
Qty: 180 TABLET | Refills: 1 | Status: SHIPPED | OUTPATIENT
Start: 2025-06-03

## 2025-06-03 RX ORDER — POTASSIUM CHLORIDE 750 MG/1
10 CAPSULE, EXTENDED RELEASE ORAL DAILY
Qty: 3 CAPSULE | Refills: 0 | Status: SHIPPED | OUTPATIENT
Start: 2025-06-03

## 2025-06-03 RX ORDER — EZETIMIBE 10 MG/1
10 TABLET ORAL DAILY
Qty: 90 TABLET | Refills: 1 | Status: SHIPPED | OUTPATIENT
Start: 2025-06-03

## 2025-06-03 RX ORDER — SOLIFENACIN SUCCINATE 10 MG/1
10 TABLET, FILM COATED ORAL DAILY
Qty: 90 TABLET | Refills: 1 | Status: SHIPPED | OUTPATIENT
Start: 2025-06-03

## 2025-06-03 RX ORDER — BUPROPION HYDROCHLORIDE 300 MG/1
300 TABLET ORAL DAILY
Qty: 90 TABLET | Refills: 1 | Status: SHIPPED | OUTPATIENT
Start: 2025-06-03

## 2025-06-12 ENCOUNTER — HOSPITAL ENCOUNTER (OUTPATIENT)
Dept: RADIOLOGY | Facility: HOSPITAL | Age: 75
Discharge: HOME | End: 2025-06-12
Attending: FAMILY MEDICINE
Payer: MEDICARE

## 2025-06-12 DIAGNOSIS — Z12.31 SCREENING MAMMOGRAM FOR BREAST CANCER: ICD-10-CM

## 2025-06-12 DIAGNOSIS — R10.13 EPIGASTRIC PAIN: ICD-10-CM

## 2025-06-12 PROCEDURE — 77063 BREAST TOMOSYNTHESIS BI: CPT | Mod: TC

## 2025-06-12 PROCEDURE — 76705 ECHO EXAM OF ABDOMEN: CPT | Mod: TC

## 2025-06-19 PROCEDURE — 87338 HPYLORI STOOL AG IA: CPT | Performed by: FAMILY MEDICINE

## 2025-07-03 ENCOUNTER — ANESTHESIA EVENT (OUTPATIENT)
Dept: ENDOSCOPY | Facility: HOSPITAL | Age: 75
End: 2025-07-03
Payer: MEDICARE

## 2025-07-03 ENCOUNTER — ANESTHESIA (OUTPATIENT)
Dept: ENDOSCOPY | Facility: HOSPITAL | Age: 75
End: 2025-07-03
Payer: MEDICARE

## 2025-07-03 ENCOUNTER — HOSPITAL ENCOUNTER (OUTPATIENT)
Facility: HOSPITAL | Age: 75
Discharge: HOME | End: 2025-07-03
Attending: INTERNAL MEDICINE | Admitting: INTERNAL MEDICINE
Payer: MEDICARE

## 2025-07-03 DIAGNOSIS — D12.5 ADENOMATOUS POLYP OF SIGMOID COLON: Primary | ICD-10-CM

## 2025-07-03 DIAGNOSIS — R19.5 ABNORMAL FECES: ICD-10-CM

## 2025-07-03 LAB
GLUCOSE SERPL-MCNC: 164 MG/DL (ref 70–110)
POCT GLUCOSE: 164 MG/DL (ref 70–110)

## 2025-07-03 PROCEDURE — 88305 TISSUE EXAM BY PATHOLOGIST: CPT | Mod: TC | Performed by: INTERNAL MEDICINE

## 2025-07-03 PROCEDURE — 45385 COLONOSCOPY W/LESION REMOVAL: CPT | Mod: PT | Performed by: INTERNAL MEDICINE

## 2025-07-03 PROCEDURE — 63600175 PHARM REV CODE 636 W HCPCS

## 2025-07-03 PROCEDURE — 63600175 PHARM REV CODE 636 W HCPCS: Performed by: SPECIALIST

## 2025-07-03 PROCEDURE — 37000009 HC ANESTHESIA EA ADD 15 MINS: Performed by: INTERNAL MEDICINE

## 2025-07-03 PROCEDURE — 45385 COLONOSCOPY W/LESION REMOVAL: CPT | Mod: PT,,, | Performed by: INTERNAL MEDICINE

## 2025-07-03 PROCEDURE — 37000008 HC ANESTHESIA 1ST 15 MINUTES: Performed by: INTERNAL MEDICINE

## 2025-07-03 PROCEDURE — 25000003 PHARM REV CODE 250

## 2025-07-03 PROCEDURE — 27201423 OPTIME MED/SURG SUP & DEVICES STERILE SUPPLY: Performed by: INTERNAL MEDICINE

## 2025-07-03 RX ORDER — LIDOCAINE HYDROCHLORIDE 20 MG/ML
INJECTION INTRAVENOUS
Status: DISCONTINUED | OUTPATIENT
Start: 2025-07-03 | End: 2025-07-03

## 2025-07-03 RX ORDER — SODIUM CHLORIDE, SODIUM LACTATE, POTASSIUM CHLORIDE, CALCIUM CHLORIDE 600; 310; 30; 20 MG/100ML; MG/100ML; MG/100ML; MG/100ML
INJECTION, SOLUTION INTRAVENOUS CONTINUOUS
Status: DISCONTINUED | OUTPATIENT
Start: 2025-07-03 | End: 2025-07-03 | Stop reason: HOSPADM

## 2025-07-03 RX ORDER — SODIUM CHLORIDE, SODIUM LACTATE, POTASSIUM CHLORIDE, CALCIUM CHLORIDE 600; 310; 30; 20 MG/100ML; MG/100ML; MG/100ML; MG/100ML
INJECTION, SOLUTION INTRAVENOUS CONTINUOUS PRN
Status: DISCONTINUED | OUTPATIENT
Start: 2025-07-03 | End: 2025-07-03

## 2025-07-03 RX ORDER — KETAMINE HCL IN 0.9 % NACL 50 MG/5 ML
SYRINGE (ML) INTRAVENOUS
Status: DISCONTINUED | OUTPATIENT
Start: 2025-07-03 | End: 2025-07-03

## 2025-07-03 RX ORDER — PROPOFOL 10 MG/ML
VIAL (ML) INTRAVENOUS CONTINUOUS PRN
Status: DISCONTINUED | OUTPATIENT
Start: 2025-07-03 | End: 2025-07-03

## 2025-07-03 RX ORDER — PROPOFOL 10 MG/ML
INJECTION, EMULSION INTRAVENOUS
Status: DISCONTINUED | OUTPATIENT
Start: 2025-07-03 | End: 2025-07-03

## 2025-07-03 RX ORDER — LIDOCAINE HYDROCHLORIDE 10 MG/ML
1 INJECTION, SOLUTION EPIDURAL; INFILTRATION; INTRACAUDAL; PERINEURAL ONCE
Status: DISCONTINUED | OUTPATIENT
Start: 2025-07-03 | End: 2025-07-03 | Stop reason: HOSPADM

## 2025-07-03 RX ADMIN — PROPOFOL 80 MG: 10 INJECTION, EMULSION INTRAVENOUS at 12:07

## 2025-07-03 RX ADMIN — SODIUM CHLORIDE, POTASSIUM CHLORIDE, SODIUM LACTATE AND CALCIUM CHLORIDE: 600; 310; 30; 20 INJECTION, SOLUTION INTRAVENOUS at 12:07

## 2025-07-03 RX ADMIN — PROPOFOL 180 MCG/KG/MIN: 10 INJECTION, EMULSION INTRAVENOUS at 12:07

## 2025-07-03 RX ADMIN — Medication 10 MG: at 12:07

## 2025-07-03 RX ADMIN — Medication 20 MG: at 12:07

## 2025-07-03 RX ADMIN — LIDOCAINE HYDROCHLORIDE 50 MG: 20 INJECTION INTRAVENOUS at 12:07

## 2025-07-03 RX ADMIN — PROPOFOL 50 MG: 10 INJECTION, EMULSION INTRAVENOUS at 12:07

## 2025-07-03 RX ADMIN — SODIUM CHLORIDE, POTASSIUM CHLORIDE, SODIUM LACTATE AND CALCIUM CHLORIDE: 600; 310; 30; 20 INJECTION, SOLUTION INTRAVENOUS at 11:07

## 2025-07-03 NOTE — DISCHARGE SUMMARY
[facility] - Endoscopy  Discharge Note  Short Stay    Procedure(s) (LRB):  COLONOSCOPY, WITH POLYPECTOMY USING SNARE (N/A)  Procedure of colonoscopy was explained to the patient and consent obtained.  The patient is transferred to the endoscopy suite, general IV anesthesia was provided by anesthesia Services.  Rectal exam was performed and normal.  The Olympus videocolonoscope was introduced per rectum and advanced around the redundant and tortuous colon to the cecum.  There was a fair amount of yellowish mucoid material scattered throughout the colon which was cleared by vigorous lavage and suctioned.  Further complicating the procedure was the patient is body habitus and breathing pattern lead to tremendous motion of the colon.  Despite this an adequate exam was carried out.  The appendix and ileocecal valve were identified and normal as was the cecal mucosa.  There were a couple of scattered small diverticula present in the ascending colon.  The transverse colon was normal.  There were scattered varying size diverticula in the descending and sigmoid colon with no evidence of diverticulitis.  In the distal sigmoid colon a 3 mm sessile polyp was identified and removed with cold snare.  The rectum was normal including retroflexed view.  The endoscope was withdrawn.  Withdrawal time cecum to rectum 27 minutes.  Procedure was well tolerated and the patient returned to the recovery area for observation.      Discharge plan-the patient will resume her regular diet today and normal activities tomorrow.  Ordinarily she was be scheduled for a 5 year surveillance colonoscopy but she told me there was no point in scheduling this because she would not keep the appointment.    OUTCOME: Patient tolerated treatment/procedure well without complication and is now ready for discharge.    DISPOSITION: Home or Self Care    FINAL DIAGNOSIS:  colon polyp<principal problem not specified>    FOLLOWUP: With primary care  provider    DISCHARGE INSTRUCTIONS:    Discharge Procedure Orders   Diet general     Call MD for:  temperature >100.4     Call MD for:  persistent nausea and vomiting     Call MD for:  severe uncontrolled pain     Call MD for:  difficulty breathing, headache or visual disturbances     Activity as tolerated         Clinical Reference Documents Added to Patient Instructions         Document    COLONOSCOPY DISCHARGE INSTRUCTIONS (ENGLISH)            TIME SPENT ON DISCHARGE: 5 minutes

## 2025-07-03 NOTE — ANESTHESIA POSTPROCEDURE EVALUATION
Anesthesia Post Evaluation    Patient: Chiquis Horner    Procedure(s) Performed: Procedure(s) (LRB):  COLONOSCOPY, WITH POLYPECTOMY USING SNARE (N/A)    Final Anesthesia Type: MAC      Patient location during evaluation: GI PACU  Patient participation: Yes- Able to Participate  Level of consciousness: awake and alert  Post-procedure vital signs: reviewed and stable  Pain management: adequate  Airway patency: patent    PONV status at discharge: No PONV  Anesthetic complications: no      Cardiovascular status: hemodynamically stable  Respiratory status: unassisted and room air  Hydration status: euvolemic  Follow-up not needed.              Vitals Value Taken Time   /75 07/03/25 11:01   Temp 36.7 °C (98.1 °F) 07/03/25 11:01   Pulse 98 07/03/25 11:01   Resp 18 07/03/25 11:01   SpO2 97 % 07/03/25 11:01         No case tracking events are documented in the log.      Pain/Emanuel Score: Emanuel Score: 8 (7/3/2025  1:25 PM)

## 2025-07-03 NOTE — TRANSFER OF CARE
"Anesthesia Transfer of Care Note    Patient: Chiquis Horner    Procedure(s) Performed: Procedure(s) (LRB):  COLONOSCOPY, WITH POLYPECTOMY USING SNARE (N/A)    Patient location: PACU    Anesthesia Type: MAC    Transport from OR: Transported from OR on room air with adequate spontaneous ventilation    Post pain: adequate analgesia    Post assessment: no apparent anesthetic complications    Post vital signs: stable    Level of consciousness: awake    Nausea/Vomiting: no nausea/vomiting    Complications: none    Transfer of care protocol was followed      Last vitals: Visit Vitals  BP (!) 160/75 (BP Location: Left arm, Patient Position: Lying)   Pulse 98   Temp 36.7 °C (98.1 °F) (Oral)   Resp 18   Ht 5' 2" (1.575 m)   Wt 98.2 kg (216 lb 9.6 oz)   SpO2 97%   BMI 39.62 kg/m²     "

## 2025-07-03 NOTE — PROVATION PATIENT INSTRUCTIONS
Discharge Summary/Instructions after an Endoscopic Procedure  Patient Name: Chiquis Horner  Patient MRN: [id number]  Patient YOB: 1950  Thursday, July 3, 2025  Joaquín Pacheco MD  Dear patient,  As a result of recent federal legislation (The Federal Cures Act), you may   receive lab or pathology results from your procedure in your [facility]   account before your physician is able to contact you. Your physician or   their representative will relay the results to you with their   recommendations at their soonest availability.  Thank you,  RESTRICTIONS:  During your procedure today, you received medications for sedation.  These   medications may affect your judgment, balance and coordination.  Therefore,   for 24 hours, you have the following restrictions:   - DO NOT drive a car, operate machinery, make legal/financial decisions,   sign important papers or drink alcohol.    ACTIVITY:  Today: no heavy lifting, straining or running due to procedural   sedation/anesthesia.  The following day: return to full activity including work.  DIET:  Eat and drink normally unless instructed otherwise.     TREATMENT FOR COMMON SIDE EFFECTS:  - Mild abdominal pain, nausea, belching, bloating or excessive gas:  rest,   eat lightly and use a heating pad.  - Sore Throat: treat with throat lozenges and/or gargle with warm salt   water.  - Because air was used during the procedure, expelling large amounts of air   from your rectum or belching is normal.  - If a bowel prep was taken, you may not have a bowel movement for 1-3 days.    This is normal.  SYMPTOMS TO WATCH FOR AND REPORT TO YOUR PHYSICIAN:  1. Abdominal pain or bloating, other than gas cramps.  2. Chest pain.  3. Back pain.  4. Signs of infection such as: chills or fever occurring within 24 hours   after the procedure.  5. Rectal bleeding, which would show as bright red, maroon, or black stools.   (A tablespoon of blood from the rectum is not serious, especially if    hemorrhoids are present.)  6. Vomiting.  7. Weakness or dizziness.  GO DIRECTLY TO THE NEAREST EMERGENCY ROOM IF YOU HAVE ANY OF THE FOLLOWING:      Difficulty breathing              Chills and/or fever over 101 F   Persistent vomiting and/or vomiting blood   Severe abdominal pain   Severe chest pain   Black, tarry stools   Bleeding- more than one tablespoon   Any other symptom or condition that you feel may need urgent attention  Your doctor recommends these additional instructions:  If any biopsies were taken, your doctors clinic will contact you in 1 to 2   weeks with any results.  Recommendations:  - Discharge patient to home (ambulatory).   - Resume previous diet today.   - Repeat colonoscopy-patient states she will not return if scheduled.   - Continue present medications.   - Patient has a contact number available for emergencies.  The signs and   symptoms of potential delayed complications were discussed with the   patient.  Return to normal activities tomorrow.  Written discharge   instructions were provided to the patient.  Impressions:  - One 3 mm polyp in the sigmoid colon, removed with a cold snare.  Resected   and retrieved.   - Diverticulosis in the sigmoid colon, in the descending colon and in the   ascending colon.   - The examination was otherwise normal on direct and retroflexion views.  For questions, problems or results please call your physician - Joaquín Pacheco MD at Work:  [phone].  [facility] , EMERGENCY ROOM PHONE NUMBER: [phone]  IF A COMPLICATION OR EMERGENCY SITUATION ARISES AND YOU ARE UNABLE TO REACH   YOUR PHYSICIAN - GO DIRECTLY TO THE EMERGENCY ROOM.  MD Joaquín Delarosa MD  7/3/2025 1:33:34 PM  This report has been verified and signed electronically.  Dear patient,  As a result of recent federal legislation (The Federal Cures Act), you may   receive lab or pathology results from your procedure in your [facility]   account before  your physician is able to contact you. Your physician or   their representative will relay the results to you with their   recommendations at their soonest availability.  Thank you,  PROVATION

## 2025-07-03 NOTE — ANESTHESIA PREPROCEDURE EVALUATION
"                                                                                                             07/03/2025  Chiquis Horner is a 74 y.o., female for CLN    Vitals:    07/03/25 1101   BP: (!) 160/75   BP Location: Left arm   Patient Position: Lying   Pulse: 98   Resp: 18   Temp: 36.7 °C (98.1 °F)   TempSrc: Oral   SpO2: 97%   Weight: 98.2 kg (216 lb 9.6 oz)   Height: 5' 2" (1.575 m)         PMH of DM2, history of cervical fusion, depression, SMO , well controlled GERD        Active Ambulatory Problems     Diagnosis Date Noted    Depression 10/17/2022    Gastroesophageal reflux disease 10/17/2022    Hyperlipidemia 10/17/2022    Hypertension 10/17/2022    Osteoarthritis of hand 10/17/2022    Type 2 diabetes mellitus without complication, without long-term current use of insulin 10/17/2022    BMI 35.0-35.9,adult 01/17/2023    Urge incontinence 11/26/2024     Resolved Ambulatory Problems     Diagnosis Date Noted    No Resolved Ambulatory Problems     No Additional Past Medical History         Past Surgical History:   Procedure Laterality Date    CERVICAL LAMINECTOMY  1977    COLONOSCOPY  2014    DR VIJAY Ramos    TOTAL ABDOMINAL HYSTERECTOMY W/ BILATERAL SALPINGOOPHORECTOMY  1987         Lab Results   Component Value Date    WBC 7.43 05/20/2025    HGB 14.1 05/20/2025    HCT 41.4 05/20/2025     05/20/2025    CHOL 233 (H) 05/20/2025    TRIG 153 (H) 05/20/2025    HDL 46 05/20/2025    ALT 23 05/20/2025    AST 23 05/20/2025     05/20/2025    K 3.4 (L) 05/20/2025     (H) 05/20/2025    CREATININE 0.74 05/20/2025    BUN 9.3 (L) 05/20/2025    CO2 25 05/20/2025    TSH 0.771 05/20/2025    HGBA1C 6.1 05/20/2025           Pre-op Assessment    I have reviewed the Patient Summary Reports.     I have reviewed the Nursing Notes. I have reviewed the NPO Status.   I have reviewed the Medications.     Review of Systems  Anesthesia Hx:  No problems with previous Anesthesia   History of prior surgery of " interest to airway management or planning:          Denies Family Hx of Anesthesia complications.    Denies Personal Hx of Anesthesia complications.                    Hematology/Oncology:  Hematology Normal   Oncology Normal                                   EENT/Dental:  EENT/Dental Normal           Cardiovascular:  Cardiovascular Normal                                              Pulmonary:  Pulmonary Normal                       Renal/:  Renal/ Normal                 Hepatic/GI:  Hepatic/GI Normal                    Musculoskeletal:  Musculoskeletal Normal                Neurological:  Neurology Normal                                      Endocrine:  Endocrine Normal            Dermatological:  Skin Normal    Psych:  Psychiatric Normal                    Physical Exam  General: Well nourished, Cooperative, Alert and Oriented    Airway:  Mallampati: I / I  Mouth Opening: Normal  TM Distance: Normal  Tongue: Normal  Neck ROM: Normal ROM    Dental:  Intact        Anesthesia Plan  Type of Anesthesia, risks & benefits discussed:    Anesthesia Type: Gen Natural Airway  Intra-op Monitoring Plan: Standard ASA Monitors  Post Op Pain Control Plan: IV/PO Opioids PRN  (medical reason for not using multimodal pain management)  Induction:  IV  Informed Consent: Informed consent signed with the Patient and all parties understand the risks and agree with anesthesia plan.  All questions answered. Patient consented to blood products? No  ASA Score: 3  Day of Surgery Review of History & Physical: H&P Update referred to the surgeon/provider.    Ready For Surgery From Anesthesia Perspective.     .

## 2025-07-03 NOTE — H&P
History and Physical    Patient Name: Chiquis Horner  YOB: 1950  Date: 07/03/2025 11:56 AM  Date of Admission: 7/3/2025  HD#0  POD#* Day of Surgery *    PRESENTING HISTORY   Chief Complaint/Reason for Admission: <principal problem not specified>    History of Present Illness:  74 y.o. female with PMH GERD, HLD, HTN, T2DM presenting today for screening colonoscopy    Denies any recent fever, chills, nausea, vomiting, chest pain, abdominal pain, bleeding per rectum.  Abdominal symptoms - none  Family history - none  Prior colonoscopy - none  Anticoagulation - none    Review of Systems:  12 point ROS negative except as stated in HPI    PAST HISTORY:   Past medical history:  Past Medical History:   Diagnosis Date    Depression 10/17/2022    Gastroesophageal reflux disease 10/17/2022    Hyperlipidemia 10/17/2022    Hypertension 10/17/2022    Osteoarthritis of hand 10/17/2022    Type 2 diabetes mellitus without complication, without long-term current use of insulin 10/17/2022    Dx 2/2021       Past surgical history:  Past Surgical History:   Procedure Laterality Date    CERVICAL LAMINECTOMY  1977    COLONOSCOPY  2014    DR VIJAY Ramos    TOTAL ABDOMINAL HYSTERECTOMY W/ BILATERAL SALPINGOOPHORECTOMY  1987       Family history:  Family History   Problem Relation Name Age of Onset    Essential Tremor Mother      Stroke Father      Breast cancer Neg Hx      Colon cancer Neg Hx         Social history:  Social History     Socioeconomic History    Marital status: Single   Tobacco Use    Smoking status: Never    Smokeless tobacco: Never   Substance and Sexual Activity    Alcohol use: Not Currently     Comment: occ    Drug use: Never    Sexual activity: Not Currently     Social Drivers of Health     Food Insecurity: No Food Insecurity (1/30/2024)    Hunger Vital Sign     Worried About Running Out of Food in the Last Year: Never true     Ran Out of Food in the Last Year: Never true     Tobacco Use History[1]  "  Social History     Substance and Sexual Activity   Alcohol Use Not Currently    Comment: occ        MEDICATIONS & ALLERGIES:     No current facility-administered medications on file prior to encounter.     Current Outpatient Medications on File Prior to Encounter   Medication Sig    esomeprazole (NEXIUM) 20 MG capsule Take 1 capsule (20 mg total) by mouth once daily.    acyclovir 5% (ZOVIRAX) 5 % Crea Apply to cold sore q 3 hours prn cold sore (Patient not taking: Reported on 6/19/2025)    furosemide (LASIX) 20 MG tablet Take 1 tablet (20 mg total) by mouth once daily.    pen needle, diabetic (TECHLITE PEN NEEDLE) 32 gauge x 5/32" Ndle USE 1 NEEDLE DAILY       Allergies:   Review of patient's allergies indicates:   Allergen Reactions    Pravastatin      myalgias    Rosuvastatin      Other reaction(s): Myalgia  myalgias       Scheduled Meds:   LIDOcaine (PF) 10 mg/ml (1%)  1 mL Intradermal Once       Continuous Infusions:   lactated ringers   Intravenous Continuous 10 mL/hr at 07/03/25 1111 New Bag at 07/03/25 1111       PRN Meds:    OBJECTIVE:   Vital Signs:  VITAL SIGNS: 24 HR MIN & MAX LAST   Temp  Min: 98.1 °F (36.7 °C)  Max: 98.1 °F (36.7 °C)  98.1 °F (36.7 °C)   BP  Min: 160/75  Max: 160/75  (!) 160/75    Pulse  Min: 98  Max: 98  98    Resp  Min: 18  Max: 18  18    SpO2  Min: 97 %  Max: 97 %  97 %      HT: 5' 2" (157.5 cm)  WT: 98.2 kg (216 lb 9.6 oz)  BMI: 39.6     Intake/output:  Intake/Output - Last 3 Shifts       None          No intake or output data in the 24 hours ending 07/03/25 1156      Physical Exam:  General: Well developed, well nourished, no acute distress  HEENT: Normocephalic, atraumatic, PERRL  CV: RR  Resp: NWOB  GI:  Abdomen soft, non-tender, non-distended, no guarding, no rebound, normoactive bowel sounds, no masses   :  Deferred  MSK: No muscle atrophy, cyanosis, peripheral edema, moving all extremities spontaneously  Skin/wounds:  No rashes, ulcers, erythema  Neuro:  CNII-XII grossly " "intact, alert and oriented to person, place, and time    Labs:  Troponin:  No results for input(s): "TROPONINI" in the last 72 hours.  CBC:  No results for input(s): "WBC", "RBC", "HGB", "HCT", "PLT", "MCV", "MCH", "MCHC" in the last 72 hours.  CMP:  No results for input(s): "GLU", "CALCIUM", "ALBUMIN", "PROT", "NA", "K", "CO2", "CL", "BUN", "CREATININE", "ALKPHOS", "ALT", "AST", "BILITOT" in the last 72 hours.  Lactic Acid:  No results for input(s): "LACTATE" in the last 72 hours.  ETOH:  No results for input(s): "ETHANOL" in the last 72 hours.   Urine Drug Screen:  No results for input(s): "COCAINE", "OPIATE", "BARBITURATE", "AMPHETAMINE", "FENTANYL", "CANNABINOIDS", "MDMA" in the last 72 hours.    Invalid input(s): "BENZODIAZEPINE", "PHENCYCLIDINE"   ABG:  No results for input(s): "PH", "PO2", "PCO2", "HCO3", "BE" in the last 168 hours.     Diagnostic Results:  No orders to display       ASSESSMENT & PLAN:    Chiquis Horner is a 74 y.o. female presenting today for screening colonoscopy. Patient has completed prep and has been NPO since mn. Patient has no recent illness or changes in medications.     Consent signed at bedside  Endo suite today for screening colonoscopy    Jeff Sanz MD  [employer] General Surgery, PGY-2  07/03/2025       [1]   Social History  Tobacco Use   Smoking Status Never   Smokeless Tobacco Never     "

## 2025-07-07 VITALS
TEMPERATURE: 98 F | OXYGEN SATURATION: 98 % | WEIGHT: 216.63 LBS | HEART RATE: 71 BPM | BODY MASS INDEX: 39.86 KG/M2 | SYSTOLIC BLOOD PRESSURE: 120 MMHG | HEIGHT: 62 IN | RESPIRATION RATE: 18 BRPM | DIASTOLIC BLOOD PRESSURE: 67 MMHG

## 2025-07-07 LAB
ESTROGEN SERPL-MCNC: NORMAL PG/ML
INSULIN SERPL-ACNC: NORMAL U[IU]/ML
LAB AP CLINICAL INFORMATION: NORMAL
LAB AP GROSS DESCRIPTION: NORMAL
LAB AP REPORT FOOTNOTES: NORMAL

## 2025-07-14 ENCOUNTER — HOSPITAL ENCOUNTER (OUTPATIENT)
Dept: RADIOLOGY | Facility: HOSPITAL | Age: 75
Discharge: HOME | End: 2025-07-14
Attending: FAMILY MEDICINE
Payer: MEDICARE

## 2025-07-14 DIAGNOSIS — R92.8 ABNORMAL MAMMOGRAM: ICD-10-CM

## 2025-07-14 PROCEDURE — 76882 US LMTD JT/FCL EVL NVASC XTR: CPT | Mod: TC,RT

## 2025-07-14 PROCEDURE — 76882 US LMTD JT/FCL EVL NVASC XTR: CPT | Mod: 26,RT,, | Performed by: STUDENT IN AN ORGANIZED HEALTH CARE EDUCATION/TRAINING PROGRAM

## 2025-08-22 ENCOUNTER — TELEPHONE (OUTPATIENT)
Dept: HEPATOLOGY | Facility: HOSPITAL | Age: 75
End: 2025-08-22
Payer: MEDICARE

## (undated) DEVICE — SNARE EXACTO COLD

## (undated) DEVICE — TRAPEASE POLYP SINGLE 29-750

## (undated) DEVICE — MANIFOLD 4 PORT

## (undated) DEVICE — DEFOAMER WATER SOLUBLE ENDO

## (undated) DEVICE — KIT SURGICAL COLON .25 1.1OZ